# Patient Record
Sex: FEMALE | Race: WHITE | Employment: FULL TIME | URBAN - METROPOLITAN AREA
[De-identification: names, ages, dates, MRNs, and addresses within clinical notes are randomized per-mention and may not be internally consistent; named-entity substitution may affect disease eponyms.]

---

## 2017-01-12 ENCOUNTER — OFFICE VISIT (OUTPATIENT)
Dept: FAMILY MEDICINE CLINIC | Age: 37
End: 2017-01-12

## 2017-01-12 VITALS
DIASTOLIC BLOOD PRESSURE: 72 MMHG | RESPIRATION RATE: 10 BRPM | HEART RATE: 101 BPM | SYSTOLIC BLOOD PRESSURE: 120 MMHG | HEIGHT: 60 IN | BODY MASS INDEX: 35.53 KG/M2 | OXYGEN SATURATION: 98 % | WEIGHT: 181 LBS

## 2017-01-12 DIAGNOSIS — G47.00 INSOMNIA, UNSPECIFIED TYPE: Primary | ICD-10-CM

## 2017-01-12 DIAGNOSIS — F31.77 BIPOLAR DISORDER, IN PARTIAL REMISSION, MOST RECENT EPISODE MIXED (HCC): ICD-10-CM

## 2017-01-12 PROCEDURE — 99213 OFFICE O/P EST LOW 20 MIN: CPT | Performed by: FAMILY MEDICINE

## 2017-01-12 RX ORDER — DIVALPROEX SODIUM 500 MG/1
TABLET, EXTENDED RELEASE ORAL
Qty: 90 TABLET | Refills: 1 | Status: SHIPPED | OUTPATIENT
Start: 2017-01-12 | End: 2017-04-06 | Stop reason: SDUPTHER

## 2017-01-12 RX ORDER — ESCITALOPRAM OXALATE 10 MG/1
10 TABLET ORAL DAILY
Qty: 30 TABLET | Refills: 6 | Status: SHIPPED | OUTPATIENT
Start: 2017-01-12 | End: 2018-03-09 | Stop reason: ALTCHOICE

## 2017-01-12 RX ORDER — TRAZODONE HYDROCHLORIDE 100 MG/1
100 TABLET ORAL NIGHTLY
Qty: 30 TABLET | Refills: 1 | Status: SHIPPED | OUTPATIENT
Start: 2017-01-12 | End: 2017-02-21 | Stop reason: SDUPTHER

## 2017-02-21 ENCOUNTER — OFFICE VISIT (OUTPATIENT)
Dept: FAMILY MEDICINE CLINIC | Age: 37
End: 2017-02-21

## 2017-02-21 VITALS
OXYGEN SATURATION: 98 % | WEIGHT: 185.2 LBS | HEIGHT: 60 IN | BODY MASS INDEX: 36.36 KG/M2 | DIASTOLIC BLOOD PRESSURE: 74 MMHG | HEART RATE: 85 BPM | SYSTOLIC BLOOD PRESSURE: 112 MMHG | RESPIRATION RATE: 16 BRPM

## 2017-02-21 DIAGNOSIS — E55.9 VITAMIN D DEFICIENCY: ICD-10-CM

## 2017-02-21 DIAGNOSIS — F51.01 PRIMARY INSOMNIA: ICD-10-CM

## 2017-02-21 DIAGNOSIS — F31.70 BIPOLAR AFFECTIVE DISORDER IN REMISSION (HCC): Primary | ICD-10-CM

## 2017-02-21 DIAGNOSIS — Z13.220 LIPID SCREENING: ICD-10-CM

## 2017-02-21 DIAGNOSIS — Z79.899 LONG TERM USE OF DRUG: ICD-10-CM

## 2017-02-21 LAB
A/G RATIO: 1.8 (ref 1.1–2.2)
ALBUMIN SERPL-MCNC: 4.4 G/DL (ref 3.4–5)
ALP BLD-CCNC: 73 U/L (ref 40–129)
ALT SERPL-CCNC: 15 U/L (ref 10–40)
ANION GAP SERPL CALCULATED.3IONS-SCNC: 13 MMOL/L (ref 3–16)
AST SERPL-CCNC: 19 U/L (ref 15–37)
BILIRUB SERPL-MCNC: <0.2 MG/DL (ref 0–1)
BUN BLDV-MCNC: 9 MG/DL (ref 7–20)
CALCIUM SERPL-MCNC: 9.3 MG/DL (ref 8.3–10.6)
CARBAMAZEPINE DOSE: ABNORMAL
CARBAMAZEPINE LEVEL: <2 UG/ML (ref 4–12)
CHLORIDE BLD-SCNC: 101 MMOL/L (ref 99–110)
CHOLESTEROL, TOTAL: 230 MG/DL (ref 0–199)
CO2: 26 MMOL/L (ref 21–32)
CREAT SERPL-MCNC: 0.7 MG/DL (ref 0.6–1.1)
GFR AFRICAN AMERICAN: >60
GFR NON-AFRICAN AMERICAN: >60
GLOBULIN: 2.4 G/DL
GLUCOSE BLD-MCNC: 85 MG/DL (ref 70–99)
HDLC SERPL-MCNC: 43 MG/DL (ref 40–60)
LDL CHOLESTEROL CALCULATED: 133 MG/DL
POTASSIUM SERPL-SCNC: 5.2 MMOL/L (ref 3.5–5.1)
SODIUM BLD-SCNC: 140 MMOL/L (ref 136–145)
TOTAL PROTEIN: 6.8 G/DL (ref 6.4–8.2)
TRIGL SERPL-MCNC: 268 MG/DL (ref 0–150)
VITAMIN D 25-HYDROXY: 21.2 NG/ML
VLDLC SERPL CALC-MCNC: 54 MG/DL

## 2017-02-21 PROCEDURE — 99213 OFFICE O/P EST LOW 20 MIN: CPT | Performed by: FAMILY MEDICINE

## 2017-02-21 PROCEDURE — 36415 COLL VENOUS BLD VENIPUNCTURE: CPT | Performed by: FAMILY MEDICINE

## 2017-02-21 RX ORDER — TRAZODONE HYDROCHLORIDE 100 MG/1
50-100 TABLET ORAL NIGHTLY
Qty: 30 TABLET | Refills: 5 | Status: SHIPPED | OUTPATIENT
Start: 2017-02-21 | End: 2017-05-01 | Stop reason: ALTCHOICE

## 2017-04-06 ENCOUNTER — TELEPHONE (OUTPATIENT)
Dept: FAMILY MEDICINE CLINIC | Age: 37
End: 2017-04-06

## 2017-04-06 RX ORDER — DIVALPROEX SODIUM 500 MG/1
TABLET, EXTENDED RELEASE ORAL
Qty: 90 TABLET | Refills: 2 | Status: SHIPPED | OUTPATIENT
Start: 2017-04-06 | End: 2018-03-09 | Stop reason: ALTCHOICE

## 2017-05-01 ENCOUNTER — OFFICE VISIT (OUTPATIENT)
Dept: FAMILY MEDICINE CLINIC | Age: 37
End: 2017-05-01

## 2017-05-01 VITALS
HEIGHT: 60 IN | WEIGHT: 184 LBS | RESPIRATION RATE: 10 BRPM | OXYGEN SATURATION: 98 % | HEART RATE: 88 BPM | DIASTOLIC BLOOD PRESSURE: 72 MMHG | BODY MASS INDEX: 36.12 KG/M2 | SYSTOLIC BLOOD PRESSURE: 118 MMHG

## 2017-05-01 DIAGNOSIS — F31.70 BIPOLAR AFFECTIVE DISORDER IN REMISSION (HCC): ICD-10-CM

## 2017-05-01 DIAGNOSIS — J18.9 PNEUMONIA OF RIGHT UPPER LOBE DUE TO INFECTIOUS ORGANISM: ICD-10-CM

## 2017-05-01 DIAGNOSIS — R05.9 COUGH: Primary | ICD-10-CM

## 2017-05-01 PROCEDURE — 99213 OFFICE O/P EST LOW 20 MIN: CPT | Performed by: FAMILY MEDICINE

## 2018-03-06 ENCOUNTER — HOSPITAL ENCOUNTER (OUTPATIENT)
Dept: OBGYN CLINIC | Age: 38
Discharge: HOME | End: 2018-03-31
Attending: OBSTETRICS & GYNECOLOGY | Admitting: OBSTETRICS & GYNECOLOGY

## 2018-03-09 ENCOUNTER — HOSPITAL ENCOUNTER (OUTPATIENT)
Dept: ULTRASOUND IMAGING | Age: 38
Discharge: HOME | End: 2018-03-09
Attending: OBSTETRICS & GYNECOLOGY | Admitting: OBSTETRICS & GYNECOLOGY

## 2018-03-09 ENCOUNTER — HOSPITAL ENCOUNTER (OUTPATIENT)
Dept: OBGYN CLINIC | Age: 38
Discharge: HOME | End: 2018-03-10
Admitting: OBSTETRICS & GYNECOLOGY

## 2018-03-09 VITALS
SYSTOLIC BLOOD PRESSURE: 127 MMHG | BODY MASS INDEX: 35.27 KG/M2 | WEIGHT: 180.6 LBS | HEART RATE: 84 BPM | DIASTOLIC BLOOD PRESSURE: 87 MMHG

## 2018-03-09 DIAGNOSIS — Z34.90 ENCOUNTER FOR SUPERVISION OF NORMAL PREGNANCY: ICD-10-CM

## 2018-03-09 DIAGNOSIS — Z34.90 ENCOUNTER FOR SUPERVISION OF NORMAL PREGNANCY, ANTEPARTUM, UNSPECIFIED GRAVIDITY: ICD-10-CM

## 2018-03-09 DIAGNOSIS — Z3A.08 8 WEEKS GESTATION OF PREGNANCY: ICD-10-CM

## 2018-03-09 DIAGNOSIS — Z34.90 ENCOUNTER FOR SUPERVISION OF NORMAL PREGNANCY, ANTEPARTUM, UNSPECIFIED GRAVIDITY: Primary | ICD-10-CM

## 2018-03-09 LAB
ABO/RH: NORMAL
AMPHETAMINE SCREEN, URINE: NORMAL
ANTIBODY SCREEN: NORMAL
BARBITURATE SCREEN URINE: NORMAL
BASOPHILS ABSOLUTE: 0.1 K/UL (ref 0–0.2)
BASOPHILS RELATIVE PERCENT: 0.5 %
BENZODIAZEPINE SCREEN, URINE: NORMAL
BILIRUBIN URINE: NEGATIVE MG/DL
BLOOD, URINE: NEGATIVE
BUPRENORPHINE URINE: NORMAL
CANNABINOID SCREEN URINE: NORMAL
CLARITY: CLEAR
COCAINE METABOLITE SCREEN URINE: NORMAL
COLOR: YELLOW
EOSINOPHILS ABSOLUTE: 0.1 K/UL (ref 0–0.6)
EOSINOPHILS RELATIVE PERCENT: 0.9 %
GLUCOSE URINE: NEGATIVE MG/DL
HCT VFR BLD CALC: 40.8 % (ref 36–48)
HEMOGLOBIN: 14.4 G/DL (ref 12–16)
HEPATITIS B SURFACE ANTIGEN INTERPRETATION: ABNORMAL
HIV AG/AB: NORMAL
HIV ANTIGEN: NORMAL
HIV-1 ANTIBODY: NORMAL
HIV-2 AB: NORMAL
KETONES, URINE: NEGATIVE MG/DL
LEUKOCYTE ESTERASE, URINE: NEGATIVE
LYMPHOCYTES ABSOLUTE: 2.3 K/UL (ref 1–5.1)
LYMPHOCYTES RELATIVE PERCENT: 20.9 %
Lab: NORMAL
MCH RBC QN AUTO: 34 PG (ref 26–34)
MCHC RBC AUTO-ENTMCNC: 35.2 G/DL (ref 31–36)
MCV RBC AUTO: 96.7 FL (ref 80–100)
METHADONE SCREEN, URINE: NORMAL
MONOCYTES ABSOLUTE: 1.2 K/UL (ref 0–1.3)
MONOCYTES RELATIVE PERCENT: 10.7 %
NEUTROPHILS ABSOLUTE: 7.5 K/UL (ref 1.7–7.7)
NEUTROPHILS RELATIVE PERCENT: 67 %
NITRITE, URINE: NEGATIVE
OPIATE SCREEN URINE: NORMAL
OXYCODONE URINE: NORMAL
PDW BLD-RTO: 13.3 % (ref 12.4–15.4)
PH UA: 5
PH UA: 6.5
PHENCYCLIDINE SCREEN URINE: NORMAL
PLATELET # BLD: 241 K/UL (ref 135–450)
PMV BLD AUTO: 9.7 FL (ref 5–10.5)
PROPOXYPHENE SCREEN: NORMAL
PROTEIN UA: NEGATIVE MG/DL
RBC # BLD: 4.22 M/UL (ref 4–5.2)
RPR: ABNORMAL
RUBELLA ANTIBODY IGG: 174 IU/ML
SPECIFIC GRAVITY UA: 1.01
UROBILINOGEN, URINE: 0.2 E.U./DL
WBC # BLD: 11.2 K/UL (ref 4–11)

## 2018-03-09 PROCEDURE — 0501F PRENATAL FLOW SHEET: CPT | Performed by: OBSTETRICS & GYNECOLOGY

## 2018-03-09 RX ORDER — QUETIAPINE FUMARATE 25 MG/1
25 TABLET, FILM COATED ORAL PRN
COMMUNITY
End: 2021-04-14

## 2018-03-09 NOTE — PLAN OF CARE
Prenatal Clinic  Clinical Level of [address].    NAME: Aureliano Jacques  YOB: 1980 GENDER: female  MEDICAL RECORD NUMBER:  [id number]  DATE:  3/9/2018    Assessment   Points   No Assessment []   0   General Prenatal assessment (e.g. weight, vital signs, urine test). Completed OB Clinic navigator tabs, in partnership with the Registered Nurse. []   1   General Prenatal assessment (e.g. weight, vital signs, urine test). Completed OB Clinic navigator tabs, in partnership with the Registered Nurse. Set up tests or procedures (e.g. ultrasound, specimen swabs, induction). [x]   2   Full Prenatal assessment (e.g. weight, vitals signs, urine test) to include a full review of history. Completed OB Clinic navigator tabs, in partnership with the Registered Nurse. Set up tests or procedures (e.g. ultrasound, specimen swabs, induction). Or transfer to an outside facility, transfer to [facility] for further evaluation, admission to the hospital.  []   3       Ambulation Status   Status Definition Points   Independent Independently able to ambulate. Fully able (without any assistance) to get on/off exam table/chair. [x]   0   Minimal Physical Assistance Requires physical assistance of one person to ambulate and/or position patient to be examined. Includes necessary physical assistance to position lower extremities on/off stool. []   1   Moderate Physical Assistance Requires at least one staff member to physically assist patient in ambulating into treatment room, and on/off recliner chair. []   2   Full Assistance Requires assistance of at least two staff members to transfer patient into treatment room and/or on/off exam table/chair. \"Total Transfer\". []   3       Teaching Effort   Effort Definition Points   No Teaching  []   0   General The teaching will focus on visit schedule and laboratory tests. This education can be found in the Discharge Instructions.  []   1 Intermediate The teaching will focus on visit schedule and laboratory tests, plus additional topics such as health and lifestyle (e.g. kick counts, diet). This education can be found in the Discharge Instructions []   2   Complex New patient information packet given to the patient/caregiver and reviewed with the Registered Nurse. [x]   3       Patient Discharge and Planning  Planning Definition Points   General Follow-up with routine assessment and planning. Discharge instructions and After Visit Summary given to patient/caregiver and reviewed with the Registered Nurse. Simple follow-up with routine assessment and planning. [x]   1   Intermediate Follow-up with routine assessment and planning. Discharge instructions and After Visit Summary given to patient/caregiver and reviewed with the Registered Nurse. Contact with additional resources (e.g. , Physician, Lactation). May include filling out forms, writing letters, communication with insurance , FLMA forms, etc.   []   2   Complex Full, comprehensive assessment and planning which includes assistance for a hospital admission or transfer to a higher level of care facility.   []   3     Is this the Patient's First Visit to the Prenatal Clinic    Yes     Is this Patient Established @ this [facility] AT [place]  Yes             Clinical Level of Care      Points  0-3  Level 1 []     Points  4-6  Level 2 [x]     Points  7-8  Level 3 []     Points  9-10  Level 4 []     Points  11-12  Level 5 []       Electronically signed by Chely Muse RN on 3/9/2018 at 9:36 AM

## 2018-03-09 NOTE — PROGRESS NOTES
Problem List Items Addressed This Visit     None      Visit Diagnoses     Encounter for supervision of normal pregnancy, antepartum, unspecified     -  Primary    Relevant Orders    Ultrasound OB less than 14 weeks single or first gestation (Completed)    Obstetric panel    RPR Reflex to [place]iter and TPPA    Type and Screen    HIV Screen                  Prenatal [address] [place] [address], [address]  [address]    First OB visit Progress Note      Subjective:     Chief Complaint: To establish prenatal care       HISTORY of PRESENT ILLNESS (HPI)     History of  Naif Sun is a 40 y.o.,  at 7w4d who presents to the clinic for her first OB visit. Era Mason  denies any complaints at this time. She does not complain of vaginal bleeding. She does not report leakage of fluid. She  does not complain of contractions. She  does not complain of decreased fetal movement. PAST MEDICAL HISTORY        Diagnosis Date    Anxiety     Psychiatric problem         PAST SURGICAL HISTORY    Past Surgical History:   Procedure Laterality Date    ANTERIOR CRUCIATE LIGAMENT REPAIR       SECTION         FAMILY HISTORY    Family History   Problem Relation Age of Onset    Heart Disease Father        SOCIAL HISTORY    Social History   Substance Use Topics    Smoking status: Current Every Day Smoker     Packs/day: 0.50     Years: 18.00     Types: Cigarettes    Smokeless tobacco: Never Used    Alcohol use No       ALLERGIES    Allergies   Allergen Reactions    Codeine        MEDICATIONS    No current outpatient prescriptions on file prior to encounter. No current facility-administered medications on file prior to encounter. REVIEW OF SYSTEMS    Pertinent items are noted in HPI.     Objective:      /87   Pulse 84   Wt 180 lb 9.6 oz (81.9 kg)   LMP 01/15/2018   BMI 35.27 kg/m²     Wt Readings from Last 2 Encounters:   18

## 2018-03-10 LAB — URINE CULTURE, ROUTINE: NORMAL

## 2018-03-13 PROBLEM — Z3A.08 8 WEEKS GESTATION OF PREGNANCY: Status: ACTIVE | Noted: 2018-03-13

## 2018-03-13 LAB
C. TRACHOMATIS DNA,THIN PREP: NEGATIVE
HPV COMMENT: NORMAL
HPV TYPE 16: NOT DETECTED
HPV TYPE 18: NOT DETECTED
HPVOH (OTHER TYPES): NOT DETECTED
N. GONORRHOEAE DNA, THIN PREP: NEGATIVE

## 2018-03-19 ENCOUNTER — HOSPITAL ENCOUNTER (OUTPATIENT)
Dept: ULTRASOUND IMAGING | Age: 38
Discharge: HOME | End: 2018-03-19
Attending: OBSTETRICS & GYNECOLOGY | Admitting: OBSTETRICS & GYNECOLOGY

## 2018-03-19 DIAGNOSIS — Z34.90 ENCOUNTER FOR SUPERVISION OF NORMAL PREGNANCY: ICD-10-CM

## 2018-03-19 DIAGNOSIS — Z34.90 ENCOUNTER FOR SUPERVISION OF NORMAL PREGNANCY, ANTEPARTUM, UNSPECIFIED GRAVIDITY: ICD-10-CM

## 2018-03-21 PROBLEM — Q51.9 UTERINE ANOMALY: Status: ACTIVE | Noted: 2018-03-21

## 2018-04-01 ENCOUNTER — HOSPITAL ENCOUNTER (OUTPATIENT)
Dept: OBGYN CLINIC | Age: 38
Discharge: HOME | End: 2018-04-30
Attending: OBSTETRICS & GYNECOLOGY | Admitting: OBSTETRICS & GYNECOLOGY

## 2018-04-03 ENCOUNTER — HOSPITAL ENCOUNTER (OUTPATIENT)
Dept: OBGYN CLINIC | Age: 38
Discharge: HOME | End: 2018-04-04
Admitting: OBSTETRICS & GYNECOLOGY

## 2018-04-03 VITALS
DIASTOLIC BLOOD PRESSURE: 78 MMHG | SYSTOLIC BLOOD PRESSURE: 119 MMHG | WEIGHT: 183.6 LBS | HEART RATE: 92 BPM | BODY MASS INDEX: 35.86 KG/M2

## 2018-04-03 LAB
BILIRUBIN URINE: NEGATIVE MG/DL
BLOOD, URINE: NEGATIVE
CLARITY: CLEAR
COLOR: YELLOW
GLUCOSE URINE: NEGATIVE MG/DL
KETONES, URINE: NEGATIVE MG/DL
LEUKOCYTE ESTERASE, URINE: NEGATIVE
NITRITE, URINE: NEGATIVE
PH UA: 5.5
PROTEIN UA: NEGATIVE MG/DL
SPECIFIC GRAVITY UA: 1.01
UROBILINOGEN, URINE: 0.2 E.U./DL

## 2018-04-03 PROCEDURE — 0502F SUBSEQUENT PRENATAL CARE: CPT | Performed by: OBSTETRICS & GYNECOLOGY

## 2018-04-03 NOTE — PROGRESS NOTES
alert and oriented to person, place and time, well-developed and well-nourished, in no acute distress  Cardiovascular: Edema  is not present. Respiratory effort: Respirations  are easy and without stridor. Gastrointestinal: Abdomen  is soft and  is non tender and gravid  Vagina: Not examined at this visit. Bladder: Not examined at this visit. Uterus:  Non tender. Pelvic Exam:  A pelvic exam was not completed during this visit. Verbal consent obtained for pelvic exam:  NA  Cervix: N/A. Specimens obtained: None. Response to pelvic exam:  Well tolerated by patient. Assessment:     Patient Active Problem List   Diagnosis Code    Bipolar disorder ([facility] F31.9    40 y.o.  Z3A.08    Uterine anomaly Q51.9       40 y.o. [id number] 9w0d    Obstetrician: Marta Bhardwaj MD      Plan:     - Prenatal labs: Reviewed by the physician. DECLINES  SCREEN  - Patient to follow up in: 1 month    Kick count N/A. Pregnancy expectations were discussed and all questions were answered.             Electronically signed by Marta Bhardwaj MD on 4/3/2018 at 2:21 PM

## 2018-04-03 NOTE — PLAN OF CARE
Prenatal Clinic  Clinical Level of [address]    NAME: Hodan Collier  YOB: 1980 GENDER: female  MEDICAL RECORD NUMBER:  [id number]  DATE:  4/3/2018    Assessment   Points   No Assessment []   0   General Prenatal assessment (e.g. weight, vital signs, urine test). Completed OB Clinic navigator tabs, in partnership with the Registered Nurse. [x]   1   General Prenatal assessment (e.g. weight, vital signs, urine test). Completed OB Clinic navigator tabs, in partnership with the Registered Nurse. Set up tests or procedures (e.g. ultrasound, specimen swabs, induction). []   2   Full Prenatal assessment (e.g. weight, vitals signs, urine test) to include a full review of history. Completed OB Clinic navigator tabs, in partnership with the Registered Nurse. Set up tests or procedures (e.g. ultrasound, specimen swabs, induction). Or transfer to an outside facility, transfer to [facility] for further evaluation, admission to the hospital.  []   3       Ambulation Status   Status Definition Points   Independent Independently able to ambulate. Fully able (without any assistance) to get on/off exam table/chair. [x]   0   Minimal Physical Assistance Requires physical assistance of one person to ambulate and/or position patient to be examined. Includes necessary physical assistance to position lower extremities on/off stool. []   1   Moderate Physical Assistance Requires at least one staff member to physically assist patient in ambulating into treatment room, and on/off recliner chair. []   2   Full Assistance Requires assistance of at least two staff members to transfer patient into treatment room and/or on/off exam table/chair. \"Total Transfer\". []   3       Teaching Effort   Effort Definition Points   No Teaching  []   0   General The teaching will focus on visit schedule and laboratory tests. This education can be found in the Discharge Instructions.  [x]   1 Intermediate The teaching will focus on visit schedule and laboratory tests, plus additional topics such as health and lifestyle (e.g. kick counts, diet). This education can be found in the Discharge Instructions []   2   Complex New patient information packet given to the patient/caregiver and reviewed with the Registered Nurse.   []   3       Patient Discharge and Planning  Planning Definition Points   General Follow-up with routine assessment and planning. Discharge instructions and After Visit Summary given to patient/caregiver and reviewed with the Registered Nurse. Simple follow-up with routine assessment and planning. [x]   1   Intermediate Follow-up with routine assessment and planning. Discharge instructions and After Visit Summary given to patient/caregiver and reviewed with the Registered Nurse. Contact with additional resources (e.g. , Physician, Lactation). May include filling out forms, writing letters, communication with insurance , FLMA forms, etc.   []   2   Complex Full, comprehensive assessment and planning which includes assistance for a hospital admission or transfer to a higher level of care facility.   []   3     Is this the Patient's First Visit to the Prenatal Clinic    No     Is this Patient Established @ [facility]  Yes             Clinical Level of Care      Points  0-3  Level 1 [x]     Points  4-6  Level 2 []     Points  7-8  Level 3 []     Points  9-10  Level 4 []     Points  11-12  Level 5 []       Electronically signed by Khurram Kim RN on 4/3/2018 at 3:21 PM

## 2018-05-01 ENCOUNTER — HOSPITAL ENCOUNTER (OUTPATIENT)
Dept: OBGYN CLINIC | Age: 38
Discharge: HOME | End: 2018-05-31
Attending: OBSTETRICS & GYNECOLOGY | Admitting: OBSTETRICS & GYNECOLOGY

## 2018-05-01 ENCOUNTER — HOSPITAL ENCOUNTER (OUTPATIENT)
Dept: OBGYN CLINIC | Age: 38
Discharge: HOME | End: 2018-05-02
Admitting: OBSTETRICS & GYNECOLOGY

## 2018-05-01 VITALS
SYSTOLIC BLOOD PRESSURE: 123 MMHG | HEART RATE: 80 BPM | WEIGHT: 184 LBS | BODY MASS INDEX: 35.94 KG/M2 | DIASTOLIC BLOOD PRESSURE: 83 MMHG

## 2018-05-01 DIAGNOSIS — Z3A.08 8 WEEKS GESTATION OF PREGNANCY: Primary | ICD-10-CM

## 2018-05-01 DIAGNOSIS — Z98.891 H/O: C-SECTION: ICD-10-CM

## 2018-05-01 DIAGNOSIS — Q51.9 UTERINE ANOMALY: ICD-10-CM

## 2018-05-01 LAB
BILIRUBIN URINE: NEGATIVE MG/DL
BLOOD, URINE: NEGATIVE
CLARITY: ABNORMAL
COLOR: YELLOW
GLUCOSE URINE: NEGATIVE MG/DL
KETONES, URINE: 15 MG/DL
LEUKOCYTE ESTERASE, URINE: ABNORMAL
NITRITE, URINE: NEGATIVE
PH UA: 5.5
PROTEIN UA: 30 MG/DL
SPECIFIC GRAVITY UA: 1.02
UROBILINOGEN, URINE: 0.2 E.U./DL

## 2018-05-01 PROCEDURE — 0502F SUBSEQUENT PRENATAL CARE: CPT | Performed by: OBSTETRICS & GYNECOLOGY

## 2018-05-01 NOTE — PROGRESS NOTES
Prenatal [address] [facility]  [address], [address] [employer], [facility] 24      OB Follow-Up Visit Progress Note    Chief Complaint   Patient presents with    Routine Prenatal Visit        Subjective:     HISTORY OF PRESENT ILLNESS (HPI)    History of  Yuko Cormier is a 40 y.o.,  at 13w0d who presents to the clinic for her follow-up OB visit. Adrianne Daigle  denies any complaints at this time. She does not complain of vaginal bleeding. She does not report leakage of fluid. She  does not complain of contractions. She  does not complain of decreased fetal movement. Has headaches with sound and vision changes. PAST MEDICAL HISTORY        Diagnosis Date    Anxiety     Psychiatric problem         PAST SURGICAL HISTORY    Past Surgical History:   Procedure Laterality Date    ANTERIOR CRUCIATE LIGAMENT REPAIR       SECTION         FAMILY HISTORY    Family History   Problem Relation Age of Onset    Heart Disease Father        SOCIAL HISTORY    Social History   Substance Use Topics    Smoking status: Former Smoker     Packs/day: 0.50     Years: 18.00     Types: Cigarettes     Quit date: 2018    Smokeless tobacco: Never Used    Alcohol use No       ALLERGIES    Allergies   Allergen Reactions    Codeine        MEDICATIONS    Current Outpatient Prescriptions on File Prior to Encounter   Medication Sig Dispense Refill    QUEtiapine (SEROQUEL) 25 MG tablet Take 25 mg by mouth as needed      Prenatal MV-Min-Fe Fum-FA-DHA (PRENATAL 1 PO) Take by mouth daily       No current facility-administered medications on file prior to encounter. REVIEW OF SYSTEMS    Pertinent items are noted in HPI.     Objective:      /83   Pulse 80   Wt 184 lb (83.5 kg)   LMP 01/15/2018   BMI 35.94 kg/m²     Wt Readings from Last 2 Encounters:   18 184 lb (83.5 kg)   18 189 lb 13.1 oz (86.1 kg)       See ACOG flowsheet above    Physical Exam:  A

## 2018-05-01 NOTE — PROGRESS NOTES
Pt reports recent blurry vision, with \"pain like a brain freeze on front left of head\"; has happened approximately 10 times. Went to ED 4/14/18- see notes.

## 2018-05-29 ENCOUNTER — HOSPITAL ENCOUNTER (OUTPATIENT)
Dept: OBGYN CLINIC | Age: 38
Discharge: HOME | End: 2018-05-30
Admitting: OBSTETRICS & GYNECOLOGY

## 2018-05-29 VITALS
BODY MASS INDEX: 36.56 KG/M2 | DIASTOLIC BLOOD PRESSURE: 88 MMHG | SYSTOLIC BLOOD PRESSURE: 133 MMHG | WEIGHT: 187.2 LBS | HEART RATE: 94 BPM

## 2018-05-29 DIAGNOSIS — Z3A.08 8 WEEKS GESTATION OF PREGNANCY: ICD-10-CM

## 2018-05-29 DIAGNOSIS — Q51.9 UTERINE ANOMALY: ICD-10-CM

## 2018-05-29 LAB
BILIRUBIN URINE: NEGATIVE MG/DL
BLOOD, URINE: NEGATIVE
CLARITY: CLEAR
COLOR: YELLOW
GLUCOSE URINE: NEGATIVE MG/DL
KETONES, URINE: NEGATIVE MG/DL
LEUKOCYTE ESTERASE, URINE: NEGATIVE
NITRITE, URINE: NEGATIVE
PH UA: 6.5
PROTEIN UA: NEGATIVE MG/DL
SPECIFIC GRAVITY UA: 1.01
UROBILINOGEN, URINE: 0.2 E.U./DL

## 2018-05-29 PROCEDURE — 0502F SUBSEQUENT PRENATAL CARE: CPT | Performed by: OBSTETRICS & GYNECOLOGY

## 2018-05-29 NOTE — PROGRESS NOTES
Prenatal [facility]  [address] [facility], [address] [place] [address], [facility] 24      OB Follow-Up Visit Progress Note    Chief Complaint   Patient presents with    Routine Prenatal Visit        Subjective:     HISTORY OF PRESENT ILLNESS (HPI)    History of  Martha Sandoval is a 40 y.o.,  at 17w0d who presents to the clinic for her follow-up OB visit. Adiel Doherty  denies any complaints at this time. She does not complain of vaginal bleeding. She does not report leakage of fluid. She  does not complain of contractions. She  does not complain of decreased fetal movement. PAST MEDICAL HISTORY        Diagnosis Date    Anxiety     Psychiatric problem         PAST SURGICAL HISTORY    Past Surgical History:   Procedure Laterality Date    ANTERIOR CRUCIATE LIGAMENT REPAIR       SECTION         FAMILY HISTORY    Family History   Problem Relation Age of Onset    Heart Disease Father        SOCIAL HISTORY    Social History   Substance Use Topics    Smoking status: Former Smoker     Packs/day: 0.50     Years: 18.00     Types: Cigarettes     Quit date: 2018    Smokeless tobacco: Never Used    Alcohol use No       ALLERGIES    Allergies   Allergen Reactions    Codeine        MEDICATIONS    Prior to Admission medications    Medication Sig Start Date End Date Taking? Authorizing Provider   QUEtiapine (SEROQUEL) 25 MG tablet Take 25 mg by mouth as needed   Yes Historical Provider, MD   Prenatal MV-Min-Fe Fum-FA-DHA (PRENATAL 1 PO) Take by mouth daily   Yes Historical Provider, MD       REVIEW OF SYSTEMS    Pertinent items are noted in HPI.     Objective:      /88   Pulse 94   Wt 187 lb 3.2 oz (84.9 kg)   LMP 01/15/2018   BMI 36.56 kg/m²     Wt Readings from Last 2 Encounters:   18 187 lb 3.2 oz (84.9 kg)   18 184 lb (83.5 kg)       See ACOG flowsheet above    Physical Exam:  A physical exam was performed during this visit. Neurologic/Psychiatric: alert and oriented X3  Constitutional: alert and oriented to person, place and time, well-developed and well-nourished, in no acute distress  Cardiovascular: Edema  is not present. Respiratory effort: Respirations  are easy and without stridor. Gastrointestinal: Abdomen  is soft and  is non tender and gravid  Vagina: Not examined at this visit. Bladder: Not examined at this visit. Uterus:  Enlarged. Pelvic Exam:  A pelvic exam was not completed at this visit. Assessment:     Patient Active Problem List   Diagnosis Code    [facility] F31.9    40 y.o.  Z3A.08    Uterine anomaly Q51.9    H/O:  Z80.0       40 y.o. [id number] 17w0d    Obstetrician: Lennox Pellet, MD      Plan:     - Prenatal labs: Reviewed by the physician. DECLINES QS. NL CL-5CM  - Patient to follow up in: 4 weeks W/US    Kick count N/A. Pregnancy expectations were discussed and all questions were answered.                 Electronically signed by Lennox Pellet, MD on 2018 at 1:53 PM

## 2018-05-30 NOTE — PROGRESS NOTES
Dr. Genny Tubbs reviewed [address] [facility] from 5/29/18 for CL. CL is 5.1cm, cervix closed.

## 2018-06-01 ENCOUNTER — HOSPITAL ENCOUNTER (OUTPATIENT)
Dept: OBGYN CLINIC | Age: 38
Discharge: HOME | End: 2018-06-30
Attending: OBSTETRICS & GYNECOLOGY | Admitting: OBSTETRICS & GYNECOLOGY

## 2018-06-01 NOTE — PLAN OF CARE
Prenatal Clinic  Clinical Level of [address].    NAME: Xiang Kurtz  YOB: 1980 GENDER: female  MEDICAL RECORD NUMBER:  [id number]  DATE:  5/29/2018    Assessment   Points   No Assessment []   0   General Prenatal assessment (e.g. weight, vital signs, urine test). Completed OB Clinic navigator tabs, in partnership with the Registered Nurse. [x]   1   General Prenatal assessment (e.g. weight, vital signs, urine test). Completed OB Clinic navigator tabs, in partnership with the Registered Nurse. Set up tests or procedures (e.g. ultrasound, specimen swabs, induction). []   2   Full Prenatal assessment (e.g. weight, vitals signs, urine test) to include a full review of history. Completed OB Clinic navigator tabs, in partnership with the Registered Nurse. Set up tests or procedures (e.g. ultrasound, specimen swabs, induction). Or transfer to an outside facility, transfer to [facility] for further evaluation, admission to the hospital.  []   3       Ambulation Status   Status Definition Points   Independent Independently able to ambulate. Fully able (without any assistance) to get on/off exam table/chair. [x]   0   Minimal Physical Assistance Requires physical assistance of one person to ambulate and/or position patient to be examined. Includes necessary physical assistance to position lower extremities on/off stool. []   1   Moderate Physical Assistance Requires at least one staff member to physically assist patient in ambulating into treatment room, and on/off recliner chair. []   2   Full Assistance Requires assistance of at least two staff members to transfer patient into treatment room and/or on/off exam table/chair. \"Total Transfer\". []   3       Teaching Effort   Effort Definition Points   No Teaching  []   0   General The teaching will focus on visit schedule and laboratory tests. This education can be found in the Discharge Instructions.  [x]   1 Intermediate The teaching will focus on visit schedule and laboratory tests, plus additional topics such as health and lifestyle (e.g. kick counts, diet). This education can be found in the Discharge Instructions []   2   Complex New patient information packet given to the patient/caregiver and reviewed with the Registered Nurse.   []   3       Patient Discharge and Planning  Planning Definition Points   General Follow-up with routine assessment and planning. Discharge instructions and After Visit Summary given to patient/caregiver and reviewed with the Registered Nurse. Simple follow-up with routine assessment and planning. [x]   1   Intermediate Follow-up with routine assessment and planning. Discharge instructions and After Visit Summary given to patient/caregiver and reviewed with the Registered Nurse. Contact with additional resources (e.g. , Physician, Lactation). May include filling out forms, writing letters, communication with insurance , FLMA forms, etc.   []   2   Complex Full, comprehensive assessment and planning which includes assistance for a hospital admission or transfer to a higher level of care facility.   []   3     Is this the Patient's First Visit to the Prenatal Clinic    No     Is this Patient Established @ this [facility] AT [place]  Yes             Clinical Level of Care      Points  0-3  Level 1 [x]     Points  4-6  Level 2 []     Points  7-8  Level 3 []     Points  9-10  Level 4 []     Points  11-12  Level 5 []       Electronically signed by Yordan Soto RN on 6/1/2018 at 9:33 AM

## 2018-07-23 LAB
GLUCOSE CHALLENGE: 114 MG/DL
HCT VFR BLD CALC: 30.6 % (ref 36–48)
HEMOGLOBIN: 10.4 G/DL (ref 12–16)
HEPATITIS C ANTIBODY INTERPRETATION: NORMAL
MCH RBC QN AUTO: 32.5 PG (ref 26–34)
MCHC RBC AUTO-ENTMCNC: 34 G/DL (ref 31–36)
MCV RBC AUTO: 95.7 FL (ref 80–100)
PDW BLD-RTO: 12.9 % (ref 12.4–15.4)
PLATELET # BLD: 202 K/UL (ref 135–450)
PMV BLD AUTO: 10.7 FL (ref 5–10.5)
RBC # BLD: 3.2 M/UL (ref 4–5.2)
WBC # BLD: 12 K/UL (ref 4–11)

## 2018-08-19 PROBLEM — O36.8130 DECREASED FETAL MOVEMENTS IN THIRD TRIMESTER: Status: ACTIVE | Noted: 2018-08-19

## 2018-10-16 ENCOUNTER — HOSPITAL ENCOUNTER (OUTPATIENT)
Age: 38
Discharge: HOME | End: 2018-10-16
Attending: OBSTETRICS & GYNECOLOGY | Admitting: OBSTETRICS & GYNECOLOGY
Payer: COMMERCIAL

## 2018-10-16 VITALS
SYSTOLIC BLOOD PRESSURE: 125 MMHG | WEIGHT: 192 LBS | BODY MASS INDEX: 37.69 KG/M2 | RESPIRATION RATE: 18 BRPM | HEIGHT: 60 IN | TEMPERATURE: 97.8 F | DIASTOLIC BLOOD PRESSURE: 85 MMHG | HEART RATE: 86 BPM

## 2018-10-16 PROBLEM — O47.9 THREATENED LABOR: Status: ACTIVE | Noted: 2018-10-16

## 2018-10-16 LAB
A/G RATIO: 1 (ref 1.1–2.2)
ALBUMIN SERPL-MCNC: 3.4 G/DL (ref 3.4–5)
ALP BLD-CCNC: 117 U/L (ref 40–129)
ALT SERPL-CCNC: 15 U/L (ref 10–40)
ANION GAP SERPL CALCULATED.3IONS-SCNC: 14 MMOL/L (ref 3–16)
AST SERPL-CCNC: 28 U/L (ref 15–37)
BASOPHILS ABSOLUTE: 0.1 K/UL (ref 0–0.2)
BASOPHILS RELATIVE PERCENT: 0.8 %
BILIRUB SERPL-MCNC: <0.2 MG/DL (ref 0–1)
BILIRUBIN URINE: NEGATIVE
BLOOD, URINE: NEGATIVE
BUN BLDV-MCNC: 4 MG/DL (ref 7–20)
CALCIUM SERPL-MCNC: 8.8 MG/DL (ref 8.3–10.6)
CHLORIDE BLD-SCNC: 108 MMOL/L (ref 99–110)
CLARITY: CLEAR
CO2: 16 MMOL/L (ref 21–32)
COLOR: YELLOW
CREAT SERPL-MCNC: 0.5 MG/DL (ref 0.6–1.1)
EOSINOPHILS ABSOLUTE: 0.1 K/UL (ref 0–0.6)
EOSINOPHILS RELATIVE PERCENT: 0.7 %
EPITHELIAL CELLS, UA: 7 /HPF (ref 0–5)
GFR AFRICAN AMERICAN: >60
GFR NON-AFRICAN AMERICAN: >60
GLOBULIN: 3.4 G/DL
GLUCOSE BLD-MCNC: 84 MG/DL (ref 70–99)
GLUCOSE URINE: NEGATIVE MG/DL
HCT VFR BLD CALC: 31.8 % (ref 36–48)
HEMOGLOBIN: 11 G/DL (ref 12–16)
HYALINE CASTS: 2 /LPF (ref 0–8)
KETONES, URINE: NEGATIVE MG/DL
LEUKOCYTE ESTERASE, URINE: ABNORMAL
LYMPHOCYTES ABSOLUTE: 1.8 K/UL (ref 1–5.1)
LYMPHOCYTES RELATIVE PERCENT: 15.7 %
MCH RBC QN AUTO: 32.4 PG (ref 26–34)
MCHC RBC AUTO-ENTMCNC: 34.6 G/DL (ref 31–36)
MCV RBC AUTO: 93.7 FL (ref 80–100)
MICROSCOPIC EXAMINATION: YES
MONOCYTES ABSOLUTE: 0.7 K/UL (ref 0–1.3)
MONOCYTES RELATIVE PERCENT: 6.5 %
NEUTROPHILS ABSOLUTE: 8.7 K/UL (ref 1.7–7.7)
NEUTROPHILS RELATIVE PERCENT: 76.3 %
NITRITE, URINE: NEGATIVE
PDW BLD-RTO: 13.2 % (ref 12.4–15.4)
PH UA: 6.5
PLATELET # BLD: 214 K/UL (ref 135–450)
PMV BLD AUTO: 10.5 FL (ref 5–10.5)
POTASSIUM SERPL-SCNC: 4.3 MMOL/L (ref 3.5–5.1)
PROTEIN UA: NEGATIVE MG/DL
RBC # BLD: 3.39 M/UL (ref 4–5.2)
RBC UA: 2 /HPF (ref 0–4)
SODIUM BLD-SCNC: 138 MMOL/L (ref 136–145)
SPECIFIC GRAVITY UA: 1.01
TOTAL PROTEIN: 6.8 G/DL (ref 6.4–8.2)
URIC ACID, SERUM: 6 MG/DL (ref 2.6–6)
URINE TYPE: ABNORMAL
UROBILINOGEN, URINE: 0.2 E.U./DL
WBC # BLD: 11.3 K/UL (ref 4–11)
WBC UA: 2 /HPF (ref 0–5)

## 2018-10-16 PROCEDURE — 81001 URINALYSIS AUTO W/SCOPE: CPT

## 2018-10-16 PROCEDURE — 80053 COMPREHEN METABOLIC PANEL: CPT

## 2018-10-16 PROCEDURE — 85025 COMPLETE CBC W/AUTO DIFF WBC: CPT

## 2018-10-16 PROCEDURE — 99211 OFF/OP EST MAY X REQ PHY/QHP: CPT

## 2018-10-16 PROCEDURE — 59025 FETAL NON-STRESS TEST: CPT

## 2018-10-16 PROCEDURE — 96360 HYDRATION IV INFUSION INIT: CPT

## 2018-10-16 PROCEDURE — 2580000003 HC RX 258: Performed by: OBSTETRICS & GYNECOLOGY

## 2018-10-16 PROCEDURE — 84550 ASSAY OF BLOOD/URIC ACID: CPT

## 2018-10-16 RX ORDER — SODIUM CHLORIDE, SODIUM LACTATE, POTASSIUM CHLORIDE, CALCIUM CHLORIDE 600; 310; 30; 20 MG/100ML; MG/100ML; MG/100ML; MG/100ML
INJECTION, SOLUTION INTRAVENOUS CONTINUOUS
Status: DISCONTINUED | OUTPATIENT
Start: 2018-10-16 | End: 2018-10-16 | Stop reason: HOSPADM

## 2018-10-16 RX ADMIN — SODIUM CHLORIDE, POTASSIUM CHLORIDE, SODIUM LACTATE AND CALCIUM CHLORIDE: 600; 310; 30; 20 INJECTION, SOLUTION INTRAVENOUS at 16:45

## 2018-10-18 ENCOUNTER — ANESTHESIA EVENT (OUTPATIENT)
Dept: LABOR AND DELIVERY | Age: 38
End: 2018-10-18
Payer: COMMERCIAL

## 2018-10-18 ENCOUNTER — ANESTHESIA (OUTPATIENT)
Dept: LABOR AND DELIVERY | Age: 38
End: 2018-10-18
Payer: COMMERCIAL

## 2018-10-18 ENCOUNTER — HOSPITAL ENCOUNTER (OUTPATIENT)
Age: 38
Setting detail: OBSERVATION
Discharge: HOME | End: 2018-10-21
Attending: OBSTETRICS & GYNECOLOGY | Admitting: OBSTETRICS & GYNECOLOGY
Payer: COMMERCIAL

## 2018-10-18 PROBLEM — O16.3 HYPERTENSION AFFECTING PREGNANCY IN THIRD TRIMESTER: Status: ACTIVE | Noted: 2018-10-18

## 2018-10-18 PROBLEM — O13.3 GESTATIONAL HYPERTENSION W/O SIGNIFICANT PROTEINURIA IN 3RD TRIMESTER: Status: ACTIVE | Noted: 2018-10-18

## 2018-10-18 LAB
A/G RATIO: 1 (ref 1.1–2.2)
ABO/RH: NORMAL
ALBUMIN SERPL-MCNC: 3.4 G/DL (ref 3.4–5)
ALP BLD-CCNC: 117 U/L (ref 40–129)
ALT SERPL-CCNC: 13 U/L (ref 10–40)
AMPHETAMINE SCREEN, URINE: NORMAL
ANION GAP SERPL CALCULATED.3IONS-SCNC: 14 MMOL/L (ref 3–16)
ANTIBODY SCREEN: NORMAL
AST SERPL-CCNC: 27 U/L (ref 15–37)
BARBITURATE SCREEN URINE: NORMAL
BASOPHILS ABSOLUTE: 0.1 K/UL (ref 0–0.2)
BASOPHILS RELATIVE PERCENT: 0.6 %
BENZODIAZEPINE SCREEN, URINE: NORMAL
BILIRUB SERPL-MCNC: <0.2 MG/DL (ref 0–1)
BUN BLDV-MCNC: 6 MG/DL (ref 7–20)
BUPRENORPHINE URINE: NORMAL
CALCIUM SERPL-MCNC: 10 MG/DL (ref 8.3–10.6)
CANNABINOID SCREEN URINE: NORMAL
CHLORIDE BLD-SCNC: 104 MMOL/L (ref 99–110)
CO2: 18 MMOL/L (ref 21–32)
COCAINE METABOLITE SCREEN URINE: NORMAL
CREAT SERPL-MCNC: 0.7 MG/DL (ref 0.6–1.1)
EOSINOPHILS ABSOLUTE: 0.1 K/UL (ref 0–0.6)
EOSINOPHILS RELATIVE PERCENT: 0.6 %
FIBRINOGEN: 470 MG/DL (ref 200–397)
GFR AFRICAN AMERICAN: >60
GFR NON-AFRICAN AMERICAN: >60
GLOBULIN: 3.3 G/DL
GLUCOSE BLD-MCNC: 91 MG/DL (ref 70–99)
HCT VFR BLD CALC: 34.5 % (ref 36–48)
HEMOGLOBIN: 11.2 G/DL (ref 12–16)
LYMPHOCYTES ABSOLUTE: 1.8 K/UL (ref 1–5.1)
LYMPHOCYTES RELATIVE PERCENT: 14.8 %
Lab: NORMAL
MCH RBC QN AUTO: 30.7 PG (ref 26–34)
MCHC RBC AUTO-ENTMCNC: 32.4 G/DL (ref 31–36)
MCV RBC AUTO: 94.7 FL (ref 80–100)
METHADONE SCREEN, URINE: NORMAL
MONOCYTES ABSOLUTE: 0.9 K/UL (ref 0–1.3)
MONOCYTES RELATIVE PERCENT: 7.5 %
NEUTROPHILS ABSOLUTE: 9.4 K/UL (ref 1.7–7.7)
NEUTROPHILS RELATIVE PERCENT: 76.5 %
OB URINE PROTEIN DIP ONLY: NEGATIVE MG/DL
OPIATE SCREEN URINE: NORMAL
OXYCODONE URINE: NORMAL
PDW BLD-RTO: 13.3 % (ref 12.4–15.4)
PH UA: 6.5
PHENCYCLIDINE SCREEN URINE: NORMAL
PLATELET # BLD: 209 K/UL (ref 135–450)
PMV BLD AUTO: 10.1 FL (ref 5–10.5)
POTASSIUM SERPL-SCNC: 4 MMOL/L (ref 3.5–5.1)
PROPOXYPHENE SCREEN: NORMAL
RBC # BLD: 3.65 M/UL (ref 4–5.2)
SODIUM BLD-SCNC: 136 MMOL/L (ref 136–145)
TOTAL PROTEIN: 6.7 G/DL (ref 6.4–8.2)
URIC ACID, SERUM: 6.6 MG/DL (ref 2.6–6)
WBC # BLD: 12.2 K/UL (ref 4–11)

## 2018-10-18 PROCEDURE — 80307 DRUG TEST PRSMV CHEM ANLYZR: CPT

## 2018-10-18 PROCEDURE — 86850 RBC ANTIBODY SCREEN: CPT

## 2018-10-18 PROCEDURE — 84550 ASSAY OF BLOOD/URIC ACID: CPT

## 2018-10-18 PROCEDURE — 86901 BLOOD TYPING SEROLOGIC RH(D): CPT

## 2018-10-18 PROCEDURE — 81003 URINALYSIS AUTO W/O SCOPE: CPT

## 2018-10-18 PROCEDURE — 85025 COMPLETE CBC W/AUTO DIFF WBC: CPT

## 2018-10-18 PROCEDURE — 2580000003 HC RX 258

## 2018-10-18 PROCEDURE — G0378 HOSPITAL OBSERVATION PER HR: HCPCS

## 2018-10-18 PROCEDURE — 86780 TREPONEMA PALLIDUM: CPT

## 2018-10-18 PROCEDURE — 96374 THER/PROPH/DIAG INJ IV PUSH: CPT

## 2018-10-18 PROCEDURE — 80053 COMPREHEN METABOLIC PANEL: CPT

## 2018-10-18 PROCEDURE — 85384 FIBRINOGEN ACTIVITY: CPT

## 2018-10-18 PROCEDURE — 86900 BLOOD TYPING SEROLOGIC ABO: CPT

## 2018-10-18 PROCEDURE — 6370000000 HC RX 637 (ALT 250 FOR IP): Performed by: OBSTETRICS & GYNECOLOGY

## 2018-10-18 PROCEDURE — 2500000003 HC RX 250 WO HCPCS

## 2018-10-18 PROCEDURE — 2580000003 HC RX 258: Performed by: OBSTETRICS & GYNECOLOGY

## 2018-10-18 PROCEDURE — 96376 TX/PRO/DX INJ SAME DRUG ADON: CPT

## 2018-10-18 PROCEDURE — S0028 INJECTION, FAMOTIDINE, 20 MG: HCPCS

## 2018-10-18 RX ORDER — SODIUM CHLORIDE 0.9 % (FLUSH) 0.9 %
10 SYRINGE (ML) INJECTION PRN
Status: DISCONTINUED | OUTPATIENT
Start: 2018-10-18 | End: 2018-10-19 | Stop reason: SDUPTHER

## 2018-10-18 RX ORDER — SODIUM CHLORIDE 0.9 % (FLUSH) 0.9 %
SYRINGE (ML) INJECTION
Status: COMPLETED
Start: 2018-10-18 | End: 2018-10-18

## 2018-10-18 RX ORDER — ONDANSETRON 2 MG/ML
4 INJECTION INTRAMUSCULAR; INTRAVENOUS EVERY 6 HOURS PRN
Status: DISCONTINUED | OUTPATIENT
Start: 2018-10-18 | End: 2018-10-19 | Stop reason: SDUPTHER

## 2018-10-18 RX ORDER — SODIUM CHLORIDE, SODIUM LACTATE, POTASSIUM CHLORIDE, CALCIUM CHLORIDE 600; 310; 30; 20 MG/100ML; MG/100ML; MG/100ML; MG/100ML
INJECTION, SOLUTION INTRAVENOUS CONTINUOUS
Status: DISCONTINUED | OUTPATIENT
Start: 2018-10-18 | End: 2018-10-21 | Stop reason: HOSPADM

## 2018-10-18 RX ORDER — SODIUM CHLORIDE 0.9 % (FLUSH) 0.9 %
10 SYRINGE (ML) INJECTION 2 TIMES DAILY
Status: DISCONTINUED | OUTPATIENT
Start: 2018-10-18 | End: 2018-10-21 | Stop reason: HOSPADM

## 2018-10-18 RX ORDER — SODIUM CHLORIDE 0.9 % (FLUSH) 0.9 %
10 SYRINGE (ML) INJECTION EVERY 12 HOURS SCHEDULED
Status: DISCONTINUED | OUTPATIENT
Start: 2018-10-18 | End: 2018-10-19 | Stop reason: SDUPTHER

## 2018-10-18 RX ORDER — CEFAZOLIN SODIUM 2 G/100ML
2 INJECTION, SOLUTION INTRAVENOUS
Status: COMPLETED | OUTPATIENT
Start: 2018-10-18 | End: 2018-10-19

## 2018-10-18 RX ORDER — PRENATAL VIT/IRON FUM/FOLIC AC 27MG-0.8MG
1 TABLET ORAL DAILY
Status: DISCONTINUED | OUTPATIENT
Start: 2018-10-18 | End: 2018-10-21 | Stop reason: HOSPADM

## 2018-10-18 RX ORDER — ACETAMINOPHEN 500 MG
1000 TABLET ORAL ONCE
Status: COMPLETED | OUTPATIENT
Start: 2018-10-18 | End: 2018-10-18

## 2018-10-18 RX ADMIN — FAMOTIDINE 20 MG: 10 INJECTION, SOLUTION INTRAVENOUS at 17:24

## 2018-10-18 RX ADMIN — SODIUM CHLORIDE, POTASSIUM CHLORIDE, SODIUM LACTATE AND CALCIUM CHLORIDE: 600; 310; 30; 20 INJECTION, SOLUTION INTRAVENOUS at 17:16

## 2018-10-18 RX ADMIN — Medication 10 ML: at 14:05

## 2018-10-18 RX ADMIN — ACETAMINOPHEN 1000 MG: 500 TABLET ORAL at 16:23

## 2018-10-18 ASSESSMENT — PAIN SCALES - GENERAL: PAINLEVEL_OUTOF10: 3

## 2018-10-18 NOTE — H&P
Department of Obstetrics and Gynecology   Obstetrics History and Physical    HISTORY OF PRESENT ILLNESS:      The patient is a 40 y.o. [id number] at 37w1d that presents from office with elevated blood pressure. Also reports new onset frontal headache - mild, 4/10 at worst. No vision changes. No abdominal pains. Some increased swelling in extremities. Mild nausea since this am, no emesis. Was in triage 2 days ago for elevated BPs - labs normal at that time. +FM. No LOF, contractions, or VB. 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 Past Medical History:        Diagnosis Date    Anemia     states quit taking iron    Anxiety     Hypertension     admitted today for htn    Psychiatric problem      Past Surgical History:        Procedure Laterality Date    ANTERIOR CRUCIATE LIGAMENT REPAIR       SECTION       Social History:    Social History     Social History    Marital status: Single     Spouse name: N/A    Number of children: N/A    Years of education: N/A     Occupational History    Not on file.      Social History Main Topics    Smoking status: Former Smoker     Packs/day: 0.50     Years: 18.00     Types: Cigarettes     Quit date: 2018    Smokeless tobacco: Never Used    Alcohol use No    Drug use: No    Sexual activity: Yes     Partners: Male     Other Topics Concern    Not on file     Social History Narrative    No narrative on file     Family History:   Family History   Problem Relation Age of Onset    Heart Disease Father      Medications Prior to Admission:  Prescriptions Prior to Admission: ferrous

## 2018-10-18 NOTE — FLOWSHEET NOTE
Pt sent over from office for a [facility] workup. C/o headache and nausea. BLE +1 pitting edema. Serial BPs going.

## 2018-10-19 VITALS
RESPIRATION RATE: 18 BRPM | DIASTOLIC BLOOD PRESSURE: 65 MMHG | SYSTOLIC BLOOD PRESSURE: 98 MMHG | OXYGEN SATURATION: 98 %

## 2018-10-19 LAB — TOTAL SYPHILLIS IGG/IGM: NORMAL

## 2018-10-19 PROCEDURE — 6360000002 HC RX W HCPCS: Performed by: OBSTETRICS & GYNECOLOGY

## 2018-10-19 PROCEDURE — 6370000000 HC RX 637 (ALT 250 FOR IP): Performed by: OBSTETRICS & GYNECOLOGY

## 2018-10-19 PROCEDURE — 7100000000 HC PACU RECOVERY - FIRST 15 MIN: Performed by: OBSTETRICS & GYNECOLOGY

## 2018-10-19 PROCEDURE — 3700000001 HC ADD 15 MINUTES (ANESTHESIA): Performed by: OBSTETRICS & GYNECOLOGY

## 2018-10-19 PROCEDURE — 2580000003 HC RX 258: Performed by: OBSTETRICS & GYNECOLOGY

## 2018-10-19 PROCEDURE — 6360000002 HC RX W HCPCS: Performed by: NURSE ANESTHETIST, CERTIFIED REGISTERED

## 2018-10-19 PROCEDURE — G0378 HOSPITAL OBSERVATION PER HR: HCPCS

## 2018-10-19 PROCEDURE — 2709999900 HC NON-CHARGEABLE SUPPLY: Performed by: OBSTETRICS & GYNECOLOGY

## 2018-10-19 PROCEDURE — 3700000000 HC ANESTHESIA ATTENDED CARE: Performed by: OBSTETRICS & GYNECOLOGY

## 2018-10-19 PROCEDURE — 7100000001 HC PACU RECOVERY - ADDTL 15 MIN: Performed by: OBSTETRICS & GYNECOLOGY

## 2018-10-19 PROCEDURE — 2500000003 HC RX 250 WO HCPCS: Performed by: OBSTETRICS & GYNECOLOGY

## 2018-10-19 PROCEDURE — 2500000003 HC RX 250 WO HCPCS: Performed by: NURSE ANESTHETIST, CERTIFIED REGISTERED

## 2018-10-19 PROCEDURE — 3609079900 HC CESAREAN SECTION: Performed by: OBSTETRICS & GYNECOLOGY

## 2018-10-19 PROCEDURE — S0028 INJECTION, FAMOTIDINE, 20 MG: HCPCS | Performed by: OBSTETRICS & GYNECOLOGY

## 2018-10-19 RX ORDER — OXYCODONE HYDROCHLORIDE 5 MG/1
5 TABLET ORAL EVERY 4 HOURS PRN
Status: DISCONTINUED | OUTPATIENT
Start: 2018-10-19 | End: 2018-10-21 | Stop reason: HOSPADM

## 2018-10-19 RX ORDER — TRISODIUM CITRATE DIHYDRATE AND CITRIC ACID MONOHYDRATE 500; 334 MG/5ML; MG/5ML
SOLUTION ORAL PRN
Status: DISCONTINUED | OUTPATIENT
Start: 2018-10-19 | End: 2018-10-19 | Stop reason: HOSPADM

## 2018-10-19 RX ORDER — FAMOTIDINE 20 MG/1
20 TABLET, FILM COATED ORAL 2 TIMES DAILY PRN
Status: DISCONTINUED | OUTPATIENT
Start: 2018-10-19 | End: 2018-10-21 | Stop reason: HOSPADM

## 2018-10-19 RX ORDER — ONDANSETRON 2 MG/ML
INJECTION INTRAMUSCULAR; INTRAVENOUS PRN
Status: DISCONTINUED | OUTPATIENT
Start: 2018-10-19 | End: 2018-10-19 | Stop reason: SDUPTHER

## 2018-10-19 RX ORDER — ONDANSETRON 8 MG/1
8 TABLET, ORALLY DISINTEGRATING ORAL EVERY 8 HOURS PRN
Status: DISCONTINUED | OUTPATIENT
Start: 2018-10-19 | End: 2018-10-21 | Stop reason: HOSPADM

## 2018-10-19 RX ORDER — IBUPROFEN 800 MG/1
800 TABLET ORAL EVERY 6 HOURS PRN
Qty: 60 TABLET | Refills: 0 | Status: SHIPPED | OUTPATIENT
Start: 2018-10-19

## 2018-10-19 RX ORDER — ONDANSETRON 2 MG/ML
4 INJECTION INTRAMUSCULAR; INTRAVENOUS EVERY 6 HOURS PRN
Status: DISCONTINUED | OUTPATIENT
Start: 2018-10-19 | End: 2018-10-21 | Stop reason: HOSPADM

## 2018-10-19 RX ORDER — ACETAMINOPHEN 500 MG
1000 TABLET ORAL EVERY 6 HOURS SCHEDULED
Status: DISCONTINUED | OUTPATIENT
Start: 2018-10-19 | End: 2018-10-21 | Stop reason: HOSPADM

## 2018-10-19 RX ORDER — KETOROLAC TROMETHAMINE 30 MG/ML
INJECTION, SOLUTION INTRAMUSCULAR; INTRAVENOUS PRN
Status: DISCONTINUED | OUTPATIENT
Start: 2018-10-19 | End: 2018-10-19 | Stop reason: SDUPTHER

## 2018-10-19 RX ORDER — DIPHENHYDRAMINE HYDROCHLORIDE 50 MG/ML
25 INJECTION INTRAMUSCULAR; INTRAVENOUS EVERY 6 HOURS PRN
Status: DISCONTINUED | OUTPATIENT
Start: 2018-10-19 | End: 2018-10-21 | Stop reason: HOSPADM

## 2018-10-19 RX ORDER — DEXAMETHASONE SODIUM PHOSPHATE 10 MG/ML
INJECTION, SOLUTION INTRAMUSCULAR; INTRAVENOUS PRN
Status: DISCONTINUED | OUTPATIENT
Start: 2018-10-19 | End: 2018-10-19 | Stop reason: SDUPTHER

## 2018-10-19 RX ORDER — FENTANYL CITRATE 50 UG/ML
INJECTION, SOLUTION INTRAMUSCULAR; INTRAVENOUS PRN
Status: DISCONTINUED | OUTPATIENT
Start: 2018-10-19 | End: 2018-10-19 | Stop reason: SDUPTHER

## 2018-10-19 RX ORDER — CARBOPROST TROMETHAMINE 250 UG/ML
250 INJECTION, SOLUTION INTRAMUSCULAR PRN
Status: DISCONTINUED | OUTPATIENT
Start: 2018-10-19 | End: 2018-10-21 | Stop reason: HOSPADM

## 2018-10-19 RX ORDER — LANOLIN 100 %
OINTMENT (GRAM) TOPICAL
Status: DISCONTINUED | OUTPATIENT
Start: 2018-10-19 | End: 2018-10-21 | Stop reason: HOSPADM

## 2018-10-19 RX ORDER — CEFAZOLIN SODIUM 2 G/100ML
INJECTION, SOLUTION INTRAVENOUS CONTINUOUS PRN
Status: DISCONTINUED | OUTPATIENT
Start: 2018-10-19 | End: 2018-10-19 | Stop reason: HOSPADM

## 2018-10-19 RX ORDER — METOCLOPRAMIDE HYDROCHLORIDE 5 MG/ML
INJECTION INTRAMUSCULAR; INTRAVENOUS PRN
Status: DISCONTINUED | OUTPATIENT
Start: 2018-10-19 | End: 2018-10-19 | Stop reason: HOSPADM

## 2018-10-19 RX ORDER — SENNA AND DOCUSATE SODIUM 50; 8.6 MG/1; MG/1
1 TABLET, FILM COATED ORAL DAILY PRN
Status: DISCONTINUED | OUTPATIENT
Start: 2018-10-19 | End: 2018-10-21 | Stop reason: HOSPADM

## 2018-10-19 RX ORDER — TRISODIUM CITRATE DIHYDRATE AND CITRIC ACID MONOHYDRATE 500; 334 MG/5ML; MG/5ML
30 SOLUTION ORAL ONCE
Status: COMPLETED | OUTPATIENT
Start: 2018-10-19 | End: 2018-10-19

## 2018-10-19 RX ORDER — OXYCODONE HYDROCHLORIDE 5 MG/1
5 TABLET ORAL EVERY 6 HOURS PRN
Qty: 28 TABLET | Refills: 0 | Status: SHIPPED | OUTPATIENT
Start: 2018-10-19 | End: 2018-10-26

## 2018-10-19 RX ORDER — FERROUS SULFATE 325(65) MG
325 TABLET ORAL 2 TIMES DAILY WITH MEALS
Status: DISCONTINUED | OUTPATIENT
Start: 2018-10-19 | End: 2018-10-21 | Stop reason: HOSPADM

## 2018-10-19 RX ORDER — OXYTOCIN 10 [USP'U]/ML
INJECTION, SOLUTION INTRAMUSCULAR; INTRAVENOUS PRN
Status: DISCONTINUED | OUTPATIENT
Start: 2018-10-19 | End: 2018-10-19 | Stop reason: SDUPTHER

## 2018-10-19 RX ORDER — MORPHINE SULFATE 0.5 MG/ML
INJECTION, SOLUTION EPIDURAL; INTRATHECAL; INTRAVENOUS PRN
Status: DISCONTINUED | OUTPATIENT
Start: 2018-10-19 | End: 2018-10-19 | Stop reason: SDUPTHER

## 2018-10-19 RX ORDER — BUPIVACAINE HYDROCHLORIDE 7.5 MG/ML
INJECTION, SOLUTION INTRASPINAL PRN
Status: DISCONTINUED | OUTPATIENT
Start: 2018-10-19 | End: 2018-10-19 | Stop reason: SDUPTHER

## 2018-10-19 RX ORDER — SODIUM CHLORIDE 0.9 % (FLUSH) 0.9 %
10 SYRINGE (ML) INJECTION PRN
Status: DISCONTINUED | OUTPATIENT
Start: 2018-10-19 | End: 2018-10-21 | Stop reason: HOSPADM

## 2018-10-19 RX ORDER — IBUPROFEN 600 MG/1
600 TABLET ORAL EVERY 6 HOURS SCHEDULED
Status: DISCONTINUED | OUTPATIENT
Start: 2018-10-19 | End: 2018-10-21 | Stop reason: HOSPADM

## 2018-10-19 RX ORDER — MISOPROSTOL 100 UG/1
800 TABLET ORAL PRN
Status: DISCONTINUED | OUTPATIENT
Start: 2018-10-19 | End: 2018-10-21 | Stop reason: HOSPADM

## 2018-10-19 RX ORDER — SODIUM CHLORIDE 0.9 % (FLUSH) 0.9 %
10 SYRINGE (ML) INJECTION EVERY 12 HOURS SCHEDULED
Status: DISCONTINUED | OUTPATIENT
Start: 2018-10-19 | End: 2018-10-21 | Stop reason: HOSPADM

## 2018-10-19 RX ORDER — BISACODYL 10 MG
10 SUPPOSITORY, RECTAL RECTAL DAILY PRN
Status: DISCONTINUED | OUTPATIENT
Start: 2018-10-19 | End: 2018-10-21 | Stop reason: HOSPADM

## 2018-10-19 RX ORDER — METOCLOPRAMIDE HYDROCHLORIDE 5 MG/ML
10 INJECTION INTRAMUSCULAR; INTRAVENOUS ONCE
Status: COMPLETED | OUTPATIENT
Start: 2018-10-19 | End: 2018-10-19

## 2018-10-19 RX ORDER — SODIUM CHLORIDE, SODIUM LACTATE, POTASSIUM CHLORIDE, CALCIUM CHLORIDE 600; 310; 30; 20 MG/100ML; MG/100ML; MG/100ML; MG/100ML
INJECTION, SOLUTION INTRAVENOUS CONTINUOUS
Status: DISCONTINUED | OUTPATIENT
Start: 2018-10-19 | End: 2018-10-21 | Stop reason: HOSPADM

## 2018-10-19 RX ORDER — DOCUSATE SODIUM 100 MG/1
100 CAPSULE, LIQUID FILLED ORAL 2 TIMES DAILY
Status: DISCONTINUED | OUTPATIENT
Start: 2018-10-19 | End: 2018-10-21 | Stop reason: HOSPADM

## 2018-10-19 RX ORDER — KETOROLAC TROMETHAMINE 30 MG/ML
30 INJECTION, SOLUTION INTRAMUSCULAR; INTRAVENOUS EVERY 6 HOURS PRN
Status: DISPENSED | OUTPATIENT
Start: 2018-10-19 | End: 2018-10-21

## 2018-10-19 RX ORDER — ACETAMINOPHEN 500 MG
1000 TABLET ORAL ONCE
Status: COMPLETED | OUTPATIENT
Start: 2018-10-19 | End: 2018-10-19

## 2018-10-19 RX ORDER — SIMETHICONE 80 MG
80 TABLET,CHEWABLE ORAL EVERY 6 HOURS PRN
Status: DISCONTINUED | OUTPATIENT
Start: 2018-10-19 | End: 2018-10-21 | Stop reason: HOSPADM

## 2018-10-19 RX ADMIN — ONDANSETRON 4 MG: 2 INJECTION INTRAMUSCULAR; INTRAVENOUS at 10:45

## 2018-10-19 RX ADMIN — SODIUM CHLORIDE, POTASSIUM CHLORIDE, SODIUM LACTATE AND CALCIUM CHLORIDE: 600; 310; 30; 20 INJECTION, SOLUTION INTRAVENOUS at 10:50

## 2018-10-19 RX ADMIN — ACETAMINOPHEN 1000 MG: 500 TABLET, FILM COATED ORAL at 22:30

## 2018-10-19 RX ADMIN — KETOROLAC TROMETHAMINE 30 MG: 30 INJECTION, SOLUTION INTRAMUSCULAR at 18:04

## 2018-10-19 RX ADMIN — MORPHINE SULFATE 0.2 MG: 0.5 INJECTION EPIDURAL; INTRATHECAL; INTRAVENOUS at 10:41

## 2018-10-19 RX ADMIN — OXYTOCIN 20 UNITS: 10 INJECTION INTRAVENOUS at 10:56

## 2018-10-19 RX ADMIN — SODIUM CHLORIDE, POTASSIUM CHLORIDE, SODIUM LACTATE AND CALCIUM CHLORIDE: 600; 310; 30; 20 INJECTION, SOLUTION INTRAVENOUS at 18:56

## 2018-10-19 RX ADMIN — FAMOTIDINE 20 MG: 10 INJECTION, SOLUTION INTRAVENOUS at 09:25

## 2018-10-19 RX ADMIN — DOCUSATE SODIUM 100 MG: 100 CAPSULE, LIQUID FILLED ORAL at 21:14

## 2018-10-19 RX ADMIN — SODIUM CITRATE AND CITRIC ACID MONOHYDRATE 30 ML: 500; 334 SOLUTION ORAL at 09:25

## 2018-10-19 RX ADMIN — SODIUM CHLORIDE, PRESERVATIVE FREE 10 ML: 5 INJECTION INTRAVENOUS at 09:26

## 2018-10-19 RX ADMIN — METOCLOPRAMIDE 10 MG: 5 INJECTION, SOLUTION INTRAMUSCULAR; INTRAVENOUS at 09:25

## 2018-10-19 RX ADMIN — DEXAMETHASONE SODIUM PHOSPHATE 8 MG: 10 INJECTION, SOLUTION INTRAMUSCULAR; INTRAVENOUS at 11:01

## 2018-10-19 RX ADMIN — KETOROLAC TROMETHAMINE 30 MG: 30 INJECTION, SOLUTION INTRAMUSCULAR; INTRAVENOUS at 11:01

## 2018-10-19 RX ADMIN — BUPIVACAINE HYDROCHLORIDE IN DEXTROSE 1.2 ML: 7.5 INJECTION, SOLUTION SUBARACHNOID at 10:41

## 2018-10-19 RX ADMIN — FENTANYL CITRATE 10 MCG: 50 INJECTION, SOLUTION INTRAMUSCULAR; INTRAVENOUS at 10:41

## 2018-10-19 RX ADMIN — SODIUM CHLORIDE, POTASSIUM CHLORIDE, SODIUM LACTATE AND CALCIUM CHLORIDE: 600; 310; 30; 20 INJECTION, SOLUTION INTRAVENOUS at 04:46

## 2018-10-19 RX ADMIN — ACETAMINOPHEN 1000 MG: 500 TABLET ORAL at 09:26

## 2018-10-19 RX ADMIN — ACETAMINOPHEN 1000 MG: 500 TABLET, FILM COATED ORAL at 15:44

## 2018-10-19 RX ADMIN — CEFAZOLIN SODIUM 2 G: 2 INJECTION, SOLUTION INTRAVENOUS at 09:26

## 2018-10-19 RX ADMIN — SODIUM CHLORIDE, POTASSIUM CHLORIDE, SODIUM LACTATE AND CALCIUM CHLORIDE: 600; 310; 30; 20 INJECTION, SOLUTION INTRAVENOUS at 11:22

## 2018-10-19 RX ADMIN — OXYTOCIN 20 UNITS: 10 INJECTION INTRAVENOUS at 11:22

## 2018-10-19 ASSESSMENT — PULMONARY FUNCTION TESTS
PIF_VALUE: 0

## 2018-10-19 ASSESSMENT — PAIN SCALES - GENERAL
PAINLEVEL_OUTOF10: 7
PAINLEVEL_OUTOF10: 5
PAINLEVEL_OUTOF10: 5

## 2018-10-19 NOTE — PROCEDURES
FETAL SURVEILLANCE TESTING SUMMARY  dimitrios Amado 10/18/18    INDICATIONS:  pregnancy-induced hypertension    OBJECTIVE RESULTS:  Fetal heart variability: moderate  Fetal Heart Rate decelerations: none  Fetal Heart Rate accelerations: yes  Baseline FHR: 130s per minute  Fetal Non-stress Test: reactive    Fetal surveillance: reassuring

## 2018-10-20 LAB
HCT VFR BLD CALC: 25.5 % (ref 36–48)
HEMOGLOBIN: 8.6 G/DL (ref 12–16)
MCH RBC QN AUTO: 31.9 PG (ref 26–34)
MCHC RBC AUTO-ENTMCNC: 33.6 G/DL (ref 31–36)
MCV RBC AUTO: 95.1 FL (ref 80–100)
PDW BLD-RTO: 13.4 % (ref 12.4–15.4)
PLATELET # BLD: 179 K/UL (ref 135–450)
PMV BLD AUTO: 9.6 FL (ref 5–10.5)
RBC # BLD: 2.68 M/UL (ref 4–5.2)
WBC # BLD: 16.1 K/UL (ref 4–11)

## 2018-10-20 PROCEDURE — 6370000000 HC RX 637 (ALT 250 FOR IP): Performed by: OBSTETRICS & GYNECOLOGY

## 2018-10-20 PROCEDURE — 96376 TX/PRO/DX INJ SAME DRUG ADON: CPT

## 2018-10-20 PROCEDURE — 2580000003 HC RX 258: Performed by: OBSTETRICS & GYNECOLOGY

## 2018-10-20 PROCEDURE — 6360000002 HC RX W HCPCS: Performed by: OBSTETRICS & GYNECOLOGY

## 2018-10-20 PROCEDURE — 96375 TX/PRO/DX INJ NEW DRUG ADDON: CPT

## 2018-10-20 PROCEDURE — G0378 HOSPITAL OBSERVATION PER HR: HCPCS

## 2018-10-20 PROCEDURE — 90471 IMMUNIZATION ADMIN: CPT | Performed by: OBSTETRICS & GYNECOLOGY

## 2018-10-20 PROCEDURE — 90715 TDAP VACCINE 7 YRS/> IM: CPT | Performed by: OBSTETRICS & GYNECOLOGY

## 2018-10-20 PROCEDURE — 85027 COMPLETE CBC AUTOMATED: CPT

## 2018-10-20 RX ADMIN — PRENATAL VIT W/ FE FUMARATE-FA TAB 27-0.8 MG 1 TABLET: 27-0.8 TAB at 09:32

## 2018-10-20 RX ADMIN — IBUPROFEN 600 MG: 600 TABLET ORAL at 18:30

## 2018-10-20 RX ADMIN — FERROUS SULFATE TAB 325 MG (65 MG ELEMENTAL FE) 325 MG: 325 (65 FE) TAB at 09:30

## 2018-10-20 RX ADMIN — ACETAMINOPHEN 1000 MG: 500 TABLET, FILM COATED ORAL at 03:56

## 2018-10-20 RX ADMIN — SODIUM CHLORIDE, POTASSIUM CHLORIDE, SODIUM LACTATE AND CALCIUM CHLORIDE: 600; 310; 30; 20 INJECTION, SOLUTION INTRAVENOUS at 03:57

## 2018-10-20 RX ADMIN — IBUPROFEN 600 MG: 600 TABLET ORAL at 12:59

## 2018-10-20 RX ADMIN — TETANUS TOXOID, REDUCED DIPHTHERIA TOXOID AND ACELLULAR PERTUSSIS VACCINE, ADSORBED 0.5 ML: 5; 2.5; 8; 8; 2.5 SUSPENSION INTRAMUSCULAR at 17:28

## 2018-10-20 RX ADMIN — DOCUSATE SODIUM 100 MG: 100 CAPSULE, LIQUID FILLED ORAL at 21:55

## 2018-10-20 RX ADMIN — KETOROLAC TROMETHAMINE 30 MG: 30 INJECTION, SOLUTION INTRAMUSCULAR at 06:20

## 2018-10-20 RX ADMIN — FERROUS SULFATE TAB 325 MG (65 MG ELEMENTAL FE) 325 MG: 325 (65 FE) TAB at 18:30

## 2018-10-20 RX ADMIN — Medication 10 ML: at 09:31

## 2018-10-20 RX ADMIN — KETOROLAC TROMETHAMINE 30 MG: 30 INJECTION, SOLUTION INTRAMUSCULAR at 00:24

## 2018-10-20 RX ADMIN — ACETAMINOPHEN 1000 MG: 500 TABLET, FILM COATED ORAL at 17:26

## 2018-10-20 RX ADMIN — DOCUSATE SODIUM 100 MG: 100 CAPSULE, LIQUID FILLED ORAL at 09:30

## 2018-10-20 RX ADMIN — ACETAMINOPHEN 1000 MG: 500 TABLET, FILM COATED ORAL at 23:33

## 2018-10-20 RX ADMIN — OXYCODONE HYDROCHLORIDE 5 MG: 5 TABLET ORAL at 18:56

## 2018-10-20 ASSESSMENT — PAIN SCALES - GENERAL
PAINLEVEL_OUTOF10: 7
PAINLEVEL_OUTOF10: 6
PAINLEVEL_OUTOF10: 4
PAINLEVEL_OUTOF10: 4
PAINLEVEL_OUTOF10: 5
PAINLEVEL_OUTOF10: 6
PAINLEVEL_OUTOF10: 4
PAINLEVEL_OUTOF10: 6

## 2018-10-20 NOTE — PLAN OF CARE
Problem: Pain:  Goal: Pain level will decrease  Pain level will decrease    Outcome: Met This Shift      Problem: Anxiety:  Goal: Level of anxiety will decrease  Level of anxiety will decrease   Outcome: Met This Shift      Problem: Aspiration - Risk of:  Goal: Absence of aspiration  Absence of aspiration   Outcome: Met This Shift      Problem: Fluid Volume - Imbalance:  Goal: Absence of postpartum hemorrhage signs and symptoms  Absence of postpartum hemorrhage signs and symptoms   Outcome: Met This Shift      Problem: Infection - Surgical Site:  Goal: Will show no infection signs and symptoms  Will show no infection signs and symptoms   Outcome: Met This Shift      Problem: Mood - Altered:  Goal: Mood stable  Mood stable   Outcome: Met This Shift      Problem: Nausea/Vomiting:  Goal: Absence of nausea/vomiting  Absence of nausea/vomiting   Outcome: Met This Shift      Problem: Pain - Acute:  Goal: Pain level will decrease  Pain level will decrease    Outcome: Met This Shift      Problem: Urinary Retention:  Goal: Urinary elimination within specified parameters  Urinary elimination within specified parameters   Outcome: Met This Shift      Problem: Venous Thromboembolism:  Goal: Absence of signs or symptoms of impaired coagulation  Absence of signs or symptoms of impaired coagulation   Outcome: Met This Shift

## 2018-10-21 VITALS
SYSTOLIC BLOOD PRESSURE: 151 MMHG | RESPIRATION RATE: 16 BRPM | HEART RATE: 80 BPM | HEIGHT: 60 IN | WEIGHT: 192 LBS | BODY MASS INDEX: 37.69 KG/M2 | TEMPERATURE: 98.4 F | DIASTOLIC BLOOD PRESSURE: 98 MMHG | OXYGEN SATURATION: 98 %

## 2018-10-21 PROCEDURE — 6370000000 HC RX 637 (ALT 250 FOR IP): Performed by: OBSTETRICS & GYNECOLOGY

## 2018-10-21 PROCEDURE — G0378 HOSPITAL OBSERVATION PER HR: HCPCS

## 2018-10-21 RX ADMIN — DOCUSATE SODIUM 100 MG: 100 CAPSULE, LIQUID FILLED ORAL at 09:52

## 2018-10-21 RX ADMIN — IBUPROFEN 600 MG: 600 TABLET ORAL at 09:51

## 2018-10-21 RX ADMIN — IBUPROFEN 600 MG: 600 TABLET ORAL at 01:00

## 2018-10-21 RX ADMIN — PRENATAL VIT W/ FE FUMARATE-FA TAB 27-0.8 MG 1 TABLET: 27-0.8 TAB at 09:52

## 2018-10-21 RX ADMIN — FERROUS SULFATE TAB 325 MG (65 MG ELEMENTAL FE) 325 MG: 325 (65 FE) TAB at 09:52

## 2018-10-21 RX ADMIN — ACETAMINOPHEN 1000 MG: 500 TABLET, FILM COATED ORAL at 12:49

## 2018-10-21 RX ADMIN — OXYCODONE HYDROCHLORIDE 5 MG: 5 TABLET ORAL at 12:49

## 2018-10-21 ASSESSMENT — PAIN SCALES - GENERAL
PAINLEVEL_OUTOF10: 7
PAINLEVEL_OUTOF10: 6
PAINLEVEL_OUTOF10: 7

## 2019-05-06 ENCOUNTER — APPOINTMENT (OUTPATIENT)
Dept: GENERAL RADIOLOGY | Age: 39
End: 2019-05-06
Payer: COMMERCIAL

## 2019-05-06 ENCOUNTER — HOSPITAL ENCOUNTER (EMERGENCY)
Age: 39
Discharge: HOME | End: 2019-05-06
Attending: EMERGENCY MEDICINE
Payer: COMMERCIAL

## 2019-05-06 VITALS
OXYGEN SATURATION: 98 % | HEART RATE: 86 BPM | TEMPERATURE: 98.6 F | DIASTOLIC BLOOD PRESSURE: 88 MMHG | BODY MASS INDEX: 33.93 KG/M2 | SYSTOLIC BLOOD PRESSURE: 144 MMHG | RESPIRATION RATE: 16 BRPM | HEIGHT: 60 IN | WEIGHT: 172.84 LBS
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
## 2019-05-06 DIAGNOSIS — S60.00XA CONTUSION OF FINGER OF RIGHT HAND, INITIAL ENCOUNTER: ICD-10-CM

## 2019-05-06 DIAGNOSIS — S90.02XA CONTUSION OF LEFT ANKLE, INITIAL ENCOUNTER: ICD-10-CM

## 2019-05-06 PROCEDURE — 73130 X-RAY EXAM OF HAND: CPT

## 2019-05-06 PROCEDURE — 73630 X-RAY EXAM OF FOOT: CPT

## 2019-05-06 PROCEDURE — 99284 EMERGENCY DEPT VISIT MOD MDM: CPT

## 2019-05-06 ASSESSMENT — PAIN DESCRIPTION - PROGRESSION: CLINICAL_PROGRESSION: NOT CHANGED

## 2019-05-06 ASSESSMENT — PAIN DESCRIPTION - PAIN TYPE
TYPE: ACUTE PAIN
TYPE: ACUTE PAIN

## 2019-05-06 ASSESSMENT — PAIN DESCRIPTION - LOCATION: LOCATION: WRIST

## 2019-05-06 ASSESSMENT — PAIN DESCRIPTION - ORIENTATION: ORIENTATION: RIGHT

## 2019-05-06 ASSESSMENT — PAIN SCALES - GENERAL
PAINLEVEL_OUTOF10: 4

## 2019-05-06 ASSESSMENT — PAIN DESCRIPTION - DESCRIPTORS
DESCRIPTORS: ACHING
DESCRIPTORS: ACHING

## 2019-05-06 ASSESSMENT — PAIN DESCRIPTION - FREQUENCY: FREQUENCY: CONTINUOUS

## 2019-05-06 ASSESSMENT — PAIN - FUNCTIONAL ASSESSMENT: PAIN_FUNCTIONAL_ASSESSMENT: ACTIVITIES ARE NOT PREVENTED

## 2019-05-06 ASSESSMENT — PAIN DESCRIPTION - ONSET: ONSET: SUDDEN
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
## 2021-04-14 ENCOUNTER — OFFICE VISIT (OUTPATIENT)
Dept: FAMILY MEDICINE CLINIC | Age: 41
End: 2021-04-14

## 2021-04-14 VITALS
TEMPERATURE: 97.8 F | OXYGEN SATURATION: 99 % | HEART RATE: 74 BPM | HEIGHT: 60 IN | SYSTOLIC BLOOD PRESSURE: 124 MMHG | WEIGHT: 174.8 LBS | BODY MASS INDEX: 34.32 KG/M2 | DIASTOLIC BLOOD PRESSURE: 74 MMHG

## 2021-04-14 DIAGNOSIS — Z76.89 ENCOUNTER TO ESTABLISH CARE: ICD-10-CM

## 2021-04-14 DIAGNOSIS — F31.73 BIPOLAR DISORDER, IN PARTIAL REMISSION, MOST RECENT EPISODE MANIC (HCC): Primary | ICD-10-CM

## 2021-04-14 DIAGNOSIS — F10.20 ALCOHOLISM (HCC): ICD-10-CM

## 2021-04-14 PROCEDURE — 99204 OFFICE O/P NEW MOD 45 MIN: CPT | Performed by: NURSE PRACTITIONER

## 2021-04-14 RX ORDER — ZIPRASIDONE HYDROCHLORIDE 60 MG/1
60 CAPSULE ORAL
Qty: 90 CAPSULE | Refills: 1 | Status: SHIPPED | OUTPATIENT
Start: 2021-04-14 | End: 2021-10-14

## 2021-04-14 RX ORDER — ZIPRASIDONE HYDROCHLORIDE 80 MG/1
80 CAPSULE ORAL DAILY
COMMUNITY
Start: 2021-03-12 | End: 2021-04-14 | Stop reason: SDUPTHER

## 2021-04-14 RX ORDER — ZIPRASIDONE HYDROCHLORIDE 60 MG/1
60 CAPSULE ORAL DAILY
COMMUNITY
Start: 2021-03-12 | End: 2021-04-14 | Stop reason: SDUPTHER

## 2021-04-14 RX ORDER — ZIPRASIDONE HYDROCHLORIDE 80 MG/1
80 CAPSULE ORAL 2 TIMES DAILY WITH MEALS
Qty: 180 CAPSULE | Refills: 1 | Status: SHIPPED | OUTPATIENT
Start: 2021-04-14 | End: 2021-08-12 | Stop reason: SDUPTHER

## 2021-04-14 ASSESSMENT — ENCOUNTER SYMPTOMS
COUGH: 0
SINUS PAIN: 0
ABDOMINAL DISTENTION: 0
WHEEZING: 0
EYE REDNESS: 0
EYE PAIN: 0
NAUSEA: 0
EYE DISCHARGE: 0
SINUS PRESSURE: 0
SHORTNESS OF BREATH: 0
DIARRHEA: 0
VOMITING: 0
SORE THROAT: 0
ABDOMINAL PAIN: 0

## 2021-04-14 ASSESSMENT — PATIENT HEALTH QUESTIONNAIRE - PHQ9
SUM OF ALL RESPONSES TO PHQ QUESTIONS 1-9: 0
1. LITTLE INTEREST OR PLEASURE IN DOING THINGS: 0
SUM OF ALL RESPONSES TO PHQ QUESTIONS 1-9: 0
2. FEELING DOWN, DEPRESSED OR HOPELESS: 0

## 2021-04-14 NOTE — PROGRESS NOTES
2021    Yaonna Montejo (:  1980) is a 36 y.o. female, here for a preventive medicine evaluation. Brittney Jackson presents today with her mom to establish care. Her main inquiry resolves around her bipolar disorder. She states that she was recently discharged from inpatient alcohol rehab mental health. Was at [facility] as well as a facility in [place]. She states that she has been sober for 53 days. Prior to being sober she drank 5 tall boys of shalini 45 daily. While she was in treatment, she was started on Geodon. She states that she takes 60 mg at breakfast, 80 mg at lunch, and 80 mg at dinner. She states that this dose has worked very well for her. She states that it is the first medication that has ever worked well at managing her symptoms. The facility will no longer manage this medication, and she was inquiring whether we could start managing this. She states that she did run out of the medication yesterday and is starting to experience symptoms of withdrawal.  She has been experiencing headaches and vomiting. They attempted to contact the rehab facility in order to get a refill, but the facility would not do this. She states that she was going to be given the IM injection for alcohol abuse, but her liver enzymes were elevated. She does not feel that she needs this at this time but wanted to inform us of this. Otherwise she only takes ibuprofen as needed. [place] is moving back up to [place]. She has a 3year-old son. She had another child who committed suicide in November. She states that she is still having anxiety and trying to cope with this at times. She would like to avoid starting any another medication for this and feels that she will be able to cope with it on its own once back on the Geodon.     Patient Active Problem List   Diagnosis    Bipolar disorder ([facility] [address]    40 y.o.     Uterine anomaly    H/O:     Hypertension affecting pregnancy in third trimester  Gestational hypertension w/o significant proteinuria in 3rd trimester     delivery delivered       Review of Systems   Constitutional: Negative for chills and fever. HENT: Negative for ear discharge, ear pain, hearing loss, sinus pressure, sinus pain and sore throat. Eyes: Negative for pain, discharge and redness. Respiratory: Negative for cough, shortness of breath and wheezing. Cardiovascular: Negative for chest pain, palpitations and leg swelling. Gastrointestinal: Negative for abdominal distention, abdominal pain, diarrhea, nausea and vomiting. Genitourinary: Negative for dysuria and hematuria. Musculoskeletal: Negative for myalgias. Skin: Negative for rash. Neurological: Positive for headaches. Negative for dizziness, weakness, light-headedness and numbness. Psychiatric/Behavioral: Negative for decreased concentration, dysphoric mood and sleep disturbance. The patient is nervous/anxious. Prior to Visit Medications    Medication Sig Taking? Authorizing Provider   ziprasidone (GEODON) 60 MG capsule Take 1 capsule by mouth daily (with breakfast) Yes DANELLE Trotter CNP   ziprasidone (GEODON) 80 MG capsule Take 1 capsule by mouth 2 times daily (with meals) 60 mg tablet in the morning. 80 mg tablet with lunch and with dinner.  Yes DANELLE Trotter CNP   ibuprofen (ADVIL;MOTRIN) 800 MG tablet Take 1 tablet by mouth every 6 hours as needed for Pain Yes Tyler Martin MD        Allergies   Allergen Reactions    Codeine Rash       Past Medical History:   Diagnosis Date    Anemia     states quit taking iron    Anxiety     Bipolar disorder ([facility] [address]     Hypertension     admitted today for htn    Psychiatric problem     Substance abuse ([facility] [address]     Alcoholism       Past Surgical History:   Procedure Laterality Date    ANTERIOR CRUCIATE LIGAMENT REPAIR       SECTION       SECTION, LOW TRANSVERSE  10/19/2018    C/S done for PIH at 37 weeks     RI  DELIVERY ONLY N/A 10/19/2018     SECTION performed by Praful Cowan MD at [facility] L&D OR       Social History     Socioeconomic History    Marital status:      Spouse name: Not on file    Number of children: Not on file    Years of education: Not on file    Highest education level: Not on file   Occupational History    Not on file   Social Needs    Financial resource strain: Not hard at all    Food insecurity     Worry: Never true     Inability: Never true   [employer] needs     Medical: No     Non-medical: No   Tobacco Use    Smoking status: Former Smoker     Packs/day: 0.50     Years: 18.00     Pack years: 9.00     Types: Cigarettes     Quit date: 2018     Years since quitting: 3.2    Smokeless tobacco: Never Used   Substance and Sexual Activity    Alcohol use: No    Drug use: No     Comment: previous    Sexual activity: Yes     Partners: Male   Lifestyle    Physical activity     Days per week: Not on file     Minutes per session: Not on file    Stress: Not on file   Relationships    Social connections     Talks on phone: Not on file     Gets together: Not on file     Attends Yarsani service: Not on file     Active member of club or organization: Not on file     Attends meetings of clubs or organizations: Not on file     Relationship status: Not on file    Intimate partner violence     Fear of current or ex partner: Not on file     Emotionally abused: Not on file     Physically abused: Not on file     Forced sexual activity: Not on file   Other Topics Concern    Not on file   Social History Narrative    Not on file        Family History   Problem Relation Age of Onset    High Cholesterol Mother     Heart Disease Father     Diabetes Father     Cancer Sister         Non-hodgkins       ADVANCE DIRECTIVE: N, <no information>    Vitals:    21 1615   BP: 124/74   Site: Left Upper Arm   Position: Sitting   Cuff Size: Medium Adult   Pulse: 74   Temp: 97.8 °F (36.6 °C)   TempSrc: Temporal   SpO2: 99%   Weight: 174 lb 12.8 oz (79.3 kg)  Comment: SHOES ON   Height: 5' (1.524 m)     Estimated body mass index is 34.14 kg/m² as calculated from the following:    Height as of this encounter: 5' (1.524 m). Weight as of this encounter: 174 lb 12.8 oz (79.3 kg). Physical Exam  Constitutional:       Appearance: Normal appearance. She is normal weight. HENT:      Head: Normocephalic and atraumatic. Right Ear: Tympanic membrane, ear canal and external ear normal.      Left Ear: Tympanic membrane, ear canal and external ear normal.      Mouth/Throat:      Mouth: Mucous membranes are moist.   Eyes:      Extraocular Movements: Extraocular movements intact. Conjunctiva/sclera: Conjunctivae normal.      Pupils: Pupils are equal, round, and reactive to light. Neck:      Musculoskeletal: Normal range of motion and neck supple. Thyroid: No thyromegaly. Cardiovascular:      Rate and Rhythm: Normal rate and regular rhythm. Pulses: Normal pulses. Heart sounds: Normal heart sounds. No murmur. No gallop. Pulmonary:      Effort: Pulmonary effort is normal.      Breath sounds: Normal breath sounds. Abdominal:      General: Bowel sounds are normal.      Palpations: Abdomen is soft. Tenderness: There is no abdominal tenderness. There is no guarding or rebound. Musculoskeletal: Normal range of motion. Lymphadenopathy:      Cervical: No cervical adenopathy. Skin:     General: Skin is warm and dry. Capillary Refill: Capillary refill takes less than 2 seconds. Neurological:      Mental Status: She is alert and oriented to person, place, and time. Psychiatric:         Mood and Affect: Mood normal.         Behavior: Behavior normal.         Thought Content: Thought content normal.         Judgment: Judgment normal.         No flowsheet data found.     Lab Results   Component Value Date    CHOL 230 02/21/2017    TRIG 268 02/21/2017    HDL 43 02/21/2017    [address] 133 02/21/2017    GLUCOSE 91 10/18/2018       The ASCVD Risk score (Kajal Plascencia., et al., 2013) failed to calculate for the following reasons:    Cannot find a previous HDL lab    Cannot find a previous total cholesterol lab    Immunization History   Administered Date(s) Administered    Influenza Virus Vaccine 10/11/2018    Tdap (Boostrix, Adacel) 10/20/2018       Health Maintenance   Topic Date Due    COVID-19 Vaccine (1) Never done    Potassium monitoring  10/18/2019    Creatinine monitoring  10/18/2019    Flu vaccine (Season Ended) 09/01/2021    Lipid screen  02/21/2022    Cervical cancer screen  03/09/2023    DTaP/Tdap/Td vaccine (2 - Td) 10/20/2028    Hepatitis C screen  Completed    HIV screen  Completed    Hepatitis A vaccine  Aged Out    Hepatitis B vaccine  Aged Out    Hib vaccine  Aged Out    Meningococcal (ACWY) vaccine  Aged Out    Pneumococcal 0-64 years Vaccine  Aged Out    Varicella vaccine  Discontinued       ASSESSMENT/PLAN:  1. Bipolar disorder, in partial remission, most recent episode manic ([facility] [address]  -Symptoms have been controlled on Geodon. Continue at current dose. The medication use as well as side effects were reviewed with the patient. They are self-pay. Was encouraged to get the good Rx yvonne. Follow-up in 6 months.  -     ziprasidone (GEODON) 60 MG capsule; Take 1 capsule by mouth daily (with breakfast), Disp-90 capsule, R-1Takes 80 mg in addition to the 60 mgNormal  -     ziprasidone (GEODON) 80 MG capsule; Take 1 capsule by mouth 2 times daily (with meals) 60 mg tablet in the morning. 80 mg tablet with lunch and with dinner., Disp-180 capsule, R-1Patient takes in addition to 60 mg tablet. Normal    2. Alcoholism [facility])  -Patient has been 53 days sober. Congratulated on this achievement. Encouraged to follow-up as needed if any issues are to arise.   It was recommended that the patient get a sponsor which is offered through the services she was using or reach out for therapist.  This will help to cope with anxiety and prevent relapse. -The patient is not interested in starting medication for the alcoholism today. She has been educated to follow-up if she feels that this would be beneficial.  Will need repeat CMP prior to starting this.  -Per patient, liver enzymes were elevated while in rehab. Will repeat at next appointment. If continue be elevated, consider ultrasound of the liver. 3. Encounter to establish care  -The patient's medical, surgical, social, and family history were reviewed with the patient. All medications were reconciled. -Patient declines COVID-19 vaccine today  -patient is not fasting today. Will obtain fasting labs at appointment in 6 months. Return in about 6 months (around 10/14/2021) for Follow-up Mental Health/Fasting Labs. An electronic signature was used to authenticate this note.     --DANELLE Barbour - CNP on 4/14/2021 at 5:14 PM

## 2021-04-14 NOTE — PATIENT INSTRUCTIONS
Patient Education        ziprasidone (oral/injection)  Pronunciation:  zi PRAY si done  Brand:  Geodon  What is the most important information I should know about ziprasidone? You should not use this medicine if you have a heart rhythm disorder, long QT syndrome, uncontrolled heart failure, or if you have recently had a heart attack. Tell your doctor about all your current medicines and any you start or stop using. Many drugs can interact, and some drugs should not be used together. Stop taking ziprasidone and call your doctor right away if you have sudden dizziness, fast or pounding heartbeats, fluttering in your chest, shortness of breath, or a new or worsening skin rash with fever, or swollen glands. Ziprasidone is not approved for use in older adults with dementia-related psychosis. What is ziprasidone? Ziprasidone is an antipsychotic medicine. Ziprasidone oral is used to treat schizophrenia and the manic symptoms of bipolar disorder (manic depression). Ziprasidone injection is used to quickly treat agitation in people with schizophrenia. Ziprasidone may also be used for purposes not listed in this medication guide. What should I discuss with my healthcare provider before taking ziprasidone? You should not use this medicine if you are allergic to ziprasidone, or if you have:  · a heart rhythm disorder;  · long QT syndrome (in you or a family member);  · uncontrolled or untreated heart failure; or  · if you have recently had a heart attack. Ziprasidone can cause a serious heart problem. Your risk may be higher if you also use certain other medicines.  Your doctor may change your treatment plan if you also use:  · an antidepressant;  · bronchodilator asthma medication;  · cancer medicine;  · certain medicines for infections;  · heart or blood pressure medication;  · medicine to treat or prevent malaria;  · medicine to prevent nausea or vomiting;  · medicine to treat a psychiatric disorder; or  · medicine to treat HIV or AIDS. Ziprasidone may increase the risk of death in older adults with dementia-related psychosis and is not approved for this use. Tell your doctor if you have ever had:  · any heart problems;  · a heart attack or stroke;  · fainting spells;  · breast cancer;  · low blood levels of potassium or magnesium;  · diabetes (ziprasidone may raise your blood sugar);  · seizures or epilepsy;  · suicidal thoughts;  · Alzheimer's disease;  · trouble swallowing;  · liver disease; or  · kidney disease. Taking antipsychotic medicine in the last 3 months of pregnancy may cause breathing problems, feeding problems, or withdrawal symptoms in the . If you get pregnant, tell your doctor right away. Do not stop taking ziprasidone without your doctor's advice. You should not breastfeed while using this medicine. Older adults may be more sensitive to the effects of this medicine. Ziprasidone is not approved for use by anyone younger than 25years old. How should I take ziprasidone? Follow all directions on your prescription label and read all medication guides or instruction sheets. Use the medicine exactly as directed. Ziprasidone oral is taken by mouth. Ziprasidone injection is injected into a muscle. A healthcare provider will give you this injection if you are unable to take the medicine by mouth. Take this medicine with food. Swallow the capsule whole. You may need frequent medical tests. If you are diabetic, check your blood sugar levels on a regular basis while you are taking ziprasidone. It may take several weeks before your symptoms improve. Do not stop using ziprasidone suddenly, even if you feel fine. Keep using the medication as directed and tell your doctor if your symptoms do not improve. Store at room temperature away from moisture, light, and heat. What happens if I miss a dose?   Take the medicine as soon as you can, but skip the missed dose if it is almost time for your next dose. Do not take two doses at one time. Get your prescription refilled before you run out of medicine completely. What happens if I overdose? Seek emergency medical attention or call the Poison Help line at 1-380.290.8785. What should I avoid while taking ziprasidone? While you are taking ziprasidone, you may be more sensitive to extreme temperatures, such as very hot or cold conditions. Avoid getting too cold, or becoming overheated or dehydrated. Drink plenty of fluids, especially in hot weather and during exercise. It is easier to become dangerously overheated and dehydrated while you are taking ziprasidone. Avoid driving or hazardous activity until you know how this medicine will affect you. Dizziness or drowsiness can cause falls, accidents, or severe injuries. Avoid getting up too fast from a sitting or lying position, or you may feel dizzy. Avoid drinking alcohol. Dangerous side effects could occur. What are the possible side effects of ziprasidone? Get emergency medical help if you have signs of an allergic reaction (hives, difficult breathing, swelling in your face or throat) or a severe skin reaction (fever, sore throat, burning in your eyes, skin pain, red or purple skin rash that spreads and causes blistering and peeling). Seek medical treatment if you have a serious drug reaction that can affect many parts of your body. Symptoms may include: skin rash, fever, swollen glands, muscle aches, severe weakness, unusual bruising, or yellowing of your skin or eyes.   Stop using ziprasidone and call your doctor at once if you have:  · fast or pounding heartbeats, fluttering in your chest, shortness of breath, and sudden dizziness (like you might pass out);  · uncontrolled muscle movements in your face (chewing, lip smacking, frowning, tongue movement, blinking or eye movement);  · any skin rash, no matter how mild;  · low white blood cell counts --fever, mouth sores, skin sores, sore throat, cough, trouble breathing;  · high blood sugar --increased thirst, increased urination, dry mouth, fruity breath odor; or  · severe nervous system reaction --very stiff (rigid) muscles, high fever, sweating, confusion, agitation. Common side effects may include:  · dizziness, drowsiness, weakness;  · headache;  · nausea, vomiting;  · trouble swallowing;  · weight gain;  · feeling restless or being unable to sit still;  · tremors, involuntary muscle movements;  · vision problems; or  · runny nose, new or worsening cough. This is not a complete list of side effects and others may occur. Call your doctor for medical advice about side effects. You may report side effects to FDA at 9-768-MZT-5826. What other drugs will affect ziprasidone? Ziprasidone can cause a serious heart problem. Your risk may be higher if you also use certain other medicines for infections, asthma, heart problems, high blood pressure, depression, mental illness, cancer, malaria, or HIV. Using ziprasidone with other drugs that make you drowsy can worsen this effect. Ask your doctor before using opioid medication, a sleeping pill, a muscle relaxer, or medicine for anxiety or seizures. Many drugs can affect ziprasidone, and some drugs should not be used at the same time. Tell your doctor about all your current medicines and any medicine you start or stop using. This includes prescription and over-the-counter medicines, vitamins, and herbal products. Not all possible interactions are listed here. Where can I get more information? Your pharmacist can provide more information about ziprasidone. Remember, keep this and all other medicines out of the reach of children, never share your medicines with others, and use this medication only for the indication prescribed. Every effort has been made to ensure that the information provided by [address] is accurate, up-to-date, and complete, but no guarantee is made to that effect. Drug information contained herein may be time sensitive. [employer] information has been compiled for use by healthcare practitioners and consumers in the [place] and therefore [employer] does not warrant that uses outside of the [place] are appropriate, unless specifically indicated otherwise. [facility] drug information does not endorse drugs, diagnose patients or recommend therapy. [facility] drug information is an informational resource designed to assist licensed healthcare practitioners in caring for their patients and/or to serve consumers viewing this service as a supplement to, and not a substitute for, the expertise, skill, knowledge and judgment of healthcare practitioners. The absence of a warning for a given drug or drug combination in no way should be construed to indicate that the drug or drug combination is safe, effective or appropriate for any given patient. [facility] does not assume any responsibility for any aspect of healthcare administered with the aid of information [facility] provides. The information contained herein is not intended to cover all possible uses, directions, precautions, warnings, drug interactions, allergic reactions, or adverse effects. If you have questions about the drugs you are taking, check with your doctor, nurse or pharmacist.  Copyright 5240-3401 [address]. Version: 13.03. Revision date: 3/16/2020. Care instructions adapted under license by [facility]. If you have questions about a medical condition or this instruction, always ask your healthcare professional. [place] [address] any warranty or liability for your use of this information.

## 2021-08-12 ENCOUNTER — OFFICE VISIT (OUTPATIENT)
Dept: FAMILY MEDICINE CLINIC | Age: 41
End: 2021-08-12

## 2021-08-12 VITALS
SYSTOLIC BLOOD PRESSURE: 118 MMHG | BODY MASS INDEX: 30.98 KG/M2 | WEIGHT: 157.8 LBS | DIASTOLIC BLOOD PRESSURE: 70 MMHG | TEMPERATURE: 98.1 F | HEART RATE: 78 BPM | OXYGEN SATURATION: 99 % | HEIGHT: 60 IN

## 2021-08-12 DIAGNOSIS — F31.73 BIPOLAR DISORDER, IN PARTIAL REMISSION, MOST RECENT EPISODE MANIC (HCC): Primary | ICD-10-CM

## 2021-08-12 PROCEDURE — 99213 OFFICE O/P EST LOW 20 MIN: CPT | Performed by: NURSE PRACTITIONER

## 2021-08-12 RX ORDER — FLUOXETINE HYDROCHLORIDE 20 MG/1
20 CAPSULE ORAL DAILY
Qty: 30 CAPSULE | Refills: 3 | Status: SHIPPED | OUTPATIENT
Start: 2021-08-12 | End: 2021-10-14 | Stop reason: SDUPTHER

## 2021-08-12 RX ORDER — ZIPRASIDONE HYDROCHLORIDE 20 MG/1
20-40 CAPSULE ORAL 2 TIMES DAILY WITH MEALS
Qty: 120 CAPSULE | Refills: 0 | Status: SHIPPED | OUTPATIENT
Start: 2021-08-12 | End: 2021-10-14

## 2021-08-12 ASSESSMENT — ENCOUNTER SYMPTOMS
WHEEZING: 0
SHORTNESS OF BREATH: 0

## 2021-08-12 NOTE — LETTER
[address] [address]  Phone: [phone]  Fax: [phone]    DANELLE Negrete CNP        August 12, 2021     Patient: Isaak Stinson   YOB: 1980   Date of Visit: 8/12/2021       To Whom It May Concern: It is my medical opinion that Isaak Stinson may return to work on 8/13/21. If you have any questions or concerns, please don't hesitate to call.     Sincerely,        DANELLE Negrete CNP

## 2021-08-12 NOTE — PATIENT INSTRUCTIONS
Take 60 mg geodon twice a day for 1-2 weeks, then decrease to 40 mg (x2 20 mg) twice a day for 1-2 weeks, then decrease to 20 mg (x1 20 mg) twice a day for 1-2 weeks. If necessary, can decrease to 20 mg daily for 1-2 weeks prior to stopping. When stopping the geodon, start the fluoxetine.

## 2021-08-12 NOTE — PROGRESS NOTES
Marielena Guillaume (:  1980) is a 36 y.o. female,Established patient, here for evaluation of the following chief complaint(s):  Depression (ROUTINE DEPRESSION FOLLOW UP TO DISCUSS MEDS- CAN NOT TAKE HE GEODON ANYMORE - SHE IS HAVING A LOT OF S/E. )      ASSESSMENT/PLAN:  1. Bipolar disorder, in partial remission, most recent episode manic ([address]  -We will wean down Geodon and start fluoxetine. The patient has not tried fluoxetine or Abilify. Has tried other numerous agents. She does not have insurance, so Jorge Fong is not an option for her.  -Since the patient only took 60 mg once a day yesterday and today so far, attempt to wean down to 60 mg daily. If the patient has difficulty with this, take 60 mg twice a day for 1-2 weeks. Decrease to 40 mg once or twice a day for 1 to 2 weeks, then decrease to 20 mg once or twice daily for 1 to 2 weeks. The medication can then be discontinued. Start Prozac at the same time as discontinuing Geodon. A refill of 20 mg Geodon has been sent to cover for this weaning down process. Can follow-up at scheduled physical with fasting labs on 10/14 or sooner if needed. -     ziprasidone (GEODON) 20 MG capsule; Take 1-2 capsules by mouth 2 times daily (with meals), Disp-120 capsule, R-0Normal  -     FLUoxetine (PROZAC) 20 MG capsule; Take 1 capsule by mouth daily, Disp-30 capsule, R-3Normal      Return in about 9 weeks (around 10/14/2021) for Annual Physical/Fasting Labs. SUBJECTIVE/OBJECTIVE:  AISHA Villalobos presents today with concerns regarding her Geodon. She states that she has been experiencing a lot of side effects. She has had increased anxiety. She has had a significant loss of appetite. Has lost almost 20 pounds. She states she is tired all day. She is wondering if she could stop this medication be started on something else.   She definitely feels as though she needs medication therapy for her bipolar, but she does not feel that the Geodon is a good choice for her.  She has tried numerous agents in the past.  States she has not tried fluoxetine or Abilify to her knowledge. She states she stopped taking the 80 mg Geodon yesterday. She noted that she was already having symptoms of withdrawal prior to taking her 60 mg dose this morning. The 60 mg dose did help with the symptoms. She has stayed sober. Is now 6 months. Review of Systems   Constitutional: Positive for unexpected weight change. Negative for chills and fever. Respiratory: Negative for shortness of breath and wheezing. Cardiovascular: Negative for chest pain, palpitations and leg swelling. Neurological: Negative for dizziness, weakness, light-headedness, numbness and headaches. Psychiatric/Behavioral: Negative for decreased concentration, dysphoric mood and sleep disturbance. The patient is nervous/anxious. Physical Exam  Constitutional:       General: She is not in acute distress. Appearance: Normal appearance. She is normal weight. Cardiovascular:      Rate and Rhythm: Normal rate and regular rhythm. Pulses: Normal pulses. Heart sounds: Normal heart sounds. No murmur heard. No gallop. Pulmonary:      Effort: Pulmonary effort is normal.      Breath sounds: Normal breath sounds. No wheezing. Neurological:      Mental Status: She is alert and oriented to person, place, and time. Psychiatric:         Mood and Affect: Mood normal.         Behavior: Behavior normal.         Thought Content: Thought content normal.         Judgment: Judgment normal.               This dictation was generated by voice recognition computer software. Although all attempts are made to edit the dictation for accuracy, there may be errors in the transcription that are not intended. An electronic signature was used to authenticate this note.     --Leopold Das, APRN - CNP

## 2021-09-07 ENCOUNTER — TELEPHONE (OUTPATIENT)
Dept: FAMILY MEDICINE CLINIC | Age: 41
End: 2021-09-07

## 2021-09-07 NOTE — TELEPHONE ENCOUNTER
If this is believed to be from coming off geodon, I suggest going back up to the dose she just came down from and stay at that dose for an additional week before tapering down further. If symptoms are severe, she can go to ER as most people don't have much issue tapering off geodon.

## 2021-09-07 NOTE — TELEPHONE ENCOUNTER
----- Message from Rhonda Marcus sent at 9/7/2021  3:13 PM EDT -----  Subject: Message to Provider    QUESTIONS  Information for Provider? Pt is being taken off of ziprasidone (GEODON) 20   MG capsule. She is has been having shakes and sweats for two days. Please   advise pt on what to do.  ---------------------------------------------------------------------------  --------------  CALL BACK INFO  What is the best way for the office to contact you? OK to leave message on   voicemail  Preferred Call Back Phone Number? [phone]  ---------------------------------------------------------------------------  --------------  SCRIPT ANSWERS  Relationship to Patient?  Self

## 2021-10-14 ENCOUNTER — OFFICE VISIT (OUTPATIENT)
Dept: FAMILY MEDICINE CLINIC | Age: 41
End: 2021-10-14

## 2021-10-14 VITALS
WEIGHT: 156.4 LBS | SYSTOLIC BLOOD PRESSURE: 118 MMHG | TEMPERATURE: 98.2 F | BODY MASS INDEX: 30.7 KG/M2 | DIASTOLIC BLOOD PRESSURE: 78 MMHG | HEART RATE: 78 BPM | OXYGEN SATURATION: 97 % | HEIGHT: 60 IN

## 2021-10-14 DIAGNOSIS — Z13.220 SCREENING, LIPID: ICD-10-CM

## 2021-10-14 DIAGNOSIS — F31.74 BIPOLAR DISORDER, IN FULL REMISSION, MOST RECENT EPISODE MANIC (HCC): Primary | ICD-10-CM

## 2021-10-14 DIAGNOSIS — Z86.2 HISTORY OF ANEMIA: ICD-10-CM

## 2021-10-14 DIAGNOSIS — Z13.1 SCREENING FOR DIABETES MELLITUS: ICD-10-CM

## 2021-10-14 LAB
A/G RATIO: 2 (ref 1.1–2.2)
ALBUMIN SERPL-MCNC: 4.5 G/DL (ref 3.4–5)
ALP BLD-CCNC: 74 U/L (ref 40–129)
ALT SERPL-CCNC: 10 U/L (ref 10–40)
ANION GAP SERPL CALCULATED.3IONS-SCNC: 11 MMOL/L (ref 3–16)
AST SERPL-CCNC: 11 U/L (ref 15–37)
BASOPHILS ABSOLUTE: 0 K/UL (ref 0–0.2)
BASOPHILS RELATIVE PERCENT: 0.5 %
BILIRUB SERPL-MCNC: 0.3 MG/DL (ref 0–1)
BUN BLDV-MCNC: 5 MG/DL (ref 7–20)
CALCIUM SERPL-MCNC: 9.6 MG/DL (ref 8.3–10.6)
CHLORIDE BLD-SCNC: 102 MMOL/L (ref 99–110)
CHOLESTEROL, TOTAL: 201 MG/DL (ref 0–199)
CO2: 25 MMOL/L (ref 21–32)
CREAT SERPL-MCNC: 0.8 MG/DL (ref 0.6–1.1)
EOSINOPHILS ABSOLUTE: 0.1 K/UL (ref 0–0.6)
EOSINOPHILS RELATIVE PERCENT: 2 %
GFR AFRICAN AMERICAN: >60
GFR NON-AFRICAN AMERICAN: >60
GLOBULIN: 2.2 G/DL
GLUCOSE BLD-MCNC: 86 MG/DL (ref 70–99)
HCT VFR BLD CALC: 38 % (ref 36–48)
HDLC SERPL-MCNC: 47 MG/DL (ref 40–60)
HEMOGLOBIN: 12.6 G/DL (ref 12–16)
LDL CHOLESTEROL CALCULATED: 137 MG/DL
LYMPHOCYTES ABSOLUTE: 1.6 K/UL (ref 1–5.1)
LYMPHOCYTES RELATIVE PERCENT: 32.2 %
MCH RBC QN AUTO: 30.6 PG (ref 26–34)
MCHC RBC AUTO-ENTMCNC: 33.2 G/DL (ref 31–36)
MCV RBC AUTO: 91.9 FL (ref 80–100)
MONOCYTES ABSOLUTE: 0.5 K/UL (ref 0–1.3)
MONOCYTES RELATIVE PERCENT: 10.3 %
NEUTROPHILS ABSOLUTE: 2.8 K/UL (ref 1.7–7.7)
NEUTROPHILS RELATIVE PERCENT: 55 %
PDW BLD-RTO: 12.8 % (ref 12.4–15.4)
PLATELET # BLD: 206 K/UL (ref 135–450)
PMV BLD AUTO: 10.5 FL (ref 5–10.5)
POTASSIUM SERPL-SCNC: 4.6 MMOL/L (ref 3.5–5.1)
RBC # BLD: 4.13 M/UL (ref 4–5.2)
SODIUM BLD-SCNC: 138 MMOL/L (ref 136–145)
TOTAL PROTEIN: 6.7 G/DL (ref 6.4–8.2)
TRIGL SERPL-MCNC: 85 MG/DL (ref 0–150)
VLDLC SERPL CALC-MCNC: 17 MG/DL
WBC # BLD: 5.1 K/UL (ref 4–11)

## 2021-10-14 PROCEDURE — 36415 COLL VENOUS BLD VENIPUNCTURE: CPT | Performed by: NURSE PRACTITIONER

## 2021-10-14 PROCEDURE — 99213 OFFICE O/P EST LOW 20 MIN: CPT | Performed by: NURSE PRACTITIONER

## 2021-10-14 RX ORDER — FLUOXETINE HYDROCHLORIDE 20 MG/1
20 CAPSULE ORAL DAILY
Qty: 90 CAPSULE | Refills: 3 | Status: SHIPPED | OUTPATIENT
Start: 2021-10-14 | End: 2022-09-08

## 2021-10-14 ASSESSMENT — ENCOUNTER SYMPTOMS
SHORTNESS OF BREATH: 0
WHEEZING: 0

## 2021-10-14 NOTE — PROGRESS NOTES
Inna Franco (:  1980) is a 36 y.o. female,Established patient, here for evaluation of the following chief complaint(s):  Mood Swings (ROUTINE FOLLOW UP FOR MOOD- Evens Corbin, HAS BEEN OFF ABOUT 2 WEEKS AND DOES NOT WANT TO CONTINUE DUE TO S/E. HAD LOSS OF APPETITE, SUICIDAL THOUGHTS, VERY TIRED. STATES THE THOUGHTS ARE GONE NOW AND SHE IS OKAY. )      ASSESSMENT/PLAN:  1. Bipolar disorder, in full remission, most recent episode manic ([address]  -Well-controlled on fluoxetine. Continue as ordered. Refill sent. Follow-up in 1 year for physical, or sooner if needed. -Emphasized the patient that it can take up to a month to truly see response to medications. If she feels that the dose needs to be increased after a month of being off the Geodon, she can schedule an appointment to discuss.  -     FLUoxetine (PROZAC) 20 MG capsule; Take 1 capsule by mouth daily, Disp-90 capsule, R-3Normal  2. History of anemia  -Anemic in the past.  Recheck CBC with labs  -     CBC Auto Differential; Future  3. Screening, lipid  -Fasting today. Has not had labs since 2018. -     Lipid Panel; Future  4. Screening for diabetes mellitus  -Fasting today. Has not had labs since 2018. -     Comprehensive Metabolic Panel; Future      Return in about 1 year (around 10/14/2022) for Annual Physical.    SUBJECTIVE/OBJECTIVE:  AISHA Jackson presents today for mood disorder follow-up. At her previous appointment, Britney Bloodgood was to be discontinued and Prozac started. The patient states that while she was working to wean down the Britney Bloodgood she started having suicidal ideation, increased anxiety, and loss of appetite. She states that she stopped the medication immediately when this occurred and she has not been having issues since. She states that the Prozac is working very well for her at 20 mg. Feels that her mood is much better since starting the new medicine. She would like to continue it as ordered.   Denies any suicidal ideation at this time. She is fasting for labs today. Has not had them since 2018. Review of Systems   Constitutional: Negative for chills and fever. Respiratory: Negative for shortness of breath and wheezing. Cardiovascular: Negative for chest pain, palpitations and leg swelling. Neurological: Negative for dizziness, weakness, light-headedness, numbness and headaches. Psychiatric/Behavioral: Negative for decreased concentration, dysphoric mood, self-injury, sleep disturbance and suicidal ideas. The patient is not nervous/anxious. Physical Exam  Constitutional:       General: She is not in acute distress. Appearance: Normal appearance. She is obese. Cardiovascular:      Rate and Rhythm: Normal rate and regular rhythm. Pulses: Normal pulses. Heart sounds: Normal heart sounds. No murmur heard. No gallop. Pulmonary:      Effort: Pulmonary effort is normal.      Breath sounds: Normal breath sounds. No wheezing. Neurological:      Mental Status: She is alert and oriented to person, place, and time. Psychiatric:         Mood and Affect: Mood normal.         Behavior: Behavior normal.         Thought Content: Thought content normal.         Judgment: Judgment normal.               This dictation was generated by voice recognition computer software. Although all attempts are made to edit the dictation for accuracy, there may be errors in the transcription that are not intended. An electronic signature was used to authenticate this note.     --DANELLE Deshpande - CNP

## 2022-01-13 ENCOUNTER — TELEPHONE (OUTPATIENT)
Dept: FAMILY MEDICINE CLINIC | Age: 42
End: 2022-01-13

## 2022-01-13 DIAGNOSIS — Z20.822 SUSPECTED COVID-19 VIRUS INFECTION: Primary | ICD-10-CM

## 2022-01-13 NOTE — TELEPHONE ENCOUNTER
PT @  [phone]    SHE TOOK A COVID TEST AT HOME TODAY AND IT WAS POSITIVE - HER WORK NEEDS A NOTE FROM THE DOCTOR - WHAT CAN SHE DO BRING IT UP HERE?

## 2022-01-14 NOTE — TELEPHONE ENCOUNTER
We typically don't recommend retesting for COVID-19 after infection.  If her employer is requiring a test prior to return to work, can order under me to be done at [place].

## 2022-01-14 NOTE — TELEPHONE ENCOUNTER
Pt is calling to say that her employer wants  A copy of the test with her name on it and the result.     She wants to know if you can  Order her a test?       Please call

## 2022-01-17 DIAGNOSIS — Z20.822 SUSPECTED COVID-19 VIRUS INFECTION: ICD-10-CM

## 2022-01-18 LAB — SARS-COV-2: DETECTED

## 2022-01-19 ENCOUNTER — TELEPHONE (OUTPATIENT)
Dept: FAMILY MEDICINE CLINIC | Age: 42
End: 2022-01-19

## 2022-06-02 ENCOUNTER — TELEPHONE (OUTPATIENT)
Dept: FAMILY MEDICINE CLINIC | Age: 42
End: 2022-06-02

## 2022-06-02 NOTE — TELEPHONE ENCOUNTER
Pt took an At home Covid test and it is positive. She took this yesterday. She is wanting to know if we can write her a note to be off work for 5 days?     Alex Gardner

## 2022-06-02 NOTE — TELEPHONE ENCOUNTER
PER MARIVEL CADENA FOR NOTE. CALLED PATIENT AND ADVISED NOTE DONE. WILL CALL WHEN ARRIVED TO HAVE SOMEONE RUN NOTE OUT TO CAR. MAURISIO

## 2022-06-02 NOTE — LETTER
[address] [address]  Phone: [phone]  Fax: [phone]    DANELLE Trejo CNP        June 2, 2022     Patient: Sharon Perez   YOB: 1980   Date of Visit: 6/2/2022       To Whom it May Concern:      Sharon Perez has tested POSITIVE for Covid-19 on 06/01/2022. She may return to work on 06/06/2022. If you have any questions or concerns, please don't hesitate to call.       Sincerely,           DANELLE Trejo CNP

## 2022-09-07 DIAGNOSIS — F31.74 BIPOLAR DISORDER, IN FULL REMISSION, MOST RECENT EPISODE MANIC (HCC): ICD-10-CM

## 2022-09-08 RX ORDER — FLUOXETINE HYDROCHLORIDE 20 MG/1
CAPSULE ORAL
Qty: 90 CAPSULE | Refills: 0 | Status: SHIPPED | OUTPATIENT
Start: 2022-09-08 | End: 2022-09-15 | Stop reason: SDUPTHER

## 2022-09-10 DIAGNOSIS — F31.74 BIPOLAR DISORDER, IN FULL REMISSION, MOST RECENT EPISODE MANIC (HCC): ICD-10-CM

## 2022-09-12 RX ORDER — FLUOXETINE HYDROCHLORIDE 20 MG/1
CAPSULE ORAL
Qty: 90 CAPSULE | Refills: 0 | OUTPATIENT
Start: 2022-09-12

## 2022-09-12 NOTE — TELEPHONE ENCOUNTER
FLUoxetine (PROZAC) 20 MG capsule 90 capsule 0 9/8/2022     Sig: Take 1 capsule by mouth once daily    Sent to pharmacy as: FLUoxetine HCl 20 MG Oral Capsule (PROZAC)    Cosign for Ordering: Accepted by DANELLE David CNP on 9/8/2022  9:37 AM    E-Prescribing Status: Receipt confirmed by pharmacy (9/8/2022  5:07 AM EDT)    Leslie Coley REQUEST

## 2022-09-15 ENCOUNTER — OFFICE VISIT (OUTPATIENT)
Dept: FAMILY MEDICINE CLINIC | Age: 42
End: 2022-09-15

## 2022-09-15 VITALS
HEIGHT: 60 IN | OXYGEN SATURATION: 98 % | SYSTOLIC BLOOD PRESSURE: 112 MMHG | DIASTOLIC BLOOD PRESSURE: 80 MMHG | HEART RATE: 80 BPM | WEIGHT: 177 LBS | BODY MASS INDEX: 34.75 KG/M2

## 2022-09-15 DIAGNOSIS — F10.20 ALCOHOLISM (HCC): ICD-10-CM

## 2022-09-15 DIAGNOSIS — Z13.220 SCREENING, LIPID: ICD-10-CM

## 2022-09-15 DIAGNOSIS — F31.74 BIPOLAR DISORDER, IN FULL REMISSION, MOST RECENT EPISODE MANIC (HCC): Primary | ICD-10-CM

## 2022-09-15 PROCEDURE — 99214 OFFICE O/P EST MOD 30 MIN: CPT | Performed by: NURSE PRACTITIONER

## 2022-09-15 RX ORDER — FLUOXETINE HYDROCHLORIDE 20 MG/1
40 CAPSULE ORAL DAILY
Qty: 180 CAPSULE | Refills: 3 | Status: SHIPPED | OUTPATIENT
Start: 2022-09-15

## 2022-09-15 ASSESSMENT — PATIENT HEALTH QUESTIONNAIRE - PHQ9
SUM OF ALL RESPONSES TO PHQ QUESTIONS 1-9: 7
6. FEELING BAD ABOUT YOURSELF - OR THAT YOU ARE A FAILURE OR HAVE LET YOURSELF OR YOUR FAMILY DOWN: 1
SUM OF ALL RESPONSES TO PHQ9 QUESTIONS 1 & 2: 1
SUM OF ALL RESPONSES TO PHQ QUESTIONS 1-9: 7
SUM OF ALL RESPONSES TO PHQ QUESTIONS 1-9: 7
7. TROUBLE CONCENTRATING ON THINGS, SUCH AS READING THE NEWSPAPER OR WATCHING TELEVISION: 3
SUM OF ALL RESPONSES TO PHQ QUESTIONS 1-9: 7
10. IF YOU CHECKED OFF ANY PROBLEMS, HOW DIFFICULT HAVE THESE PROBLEMS MADE IT FOR YOU TO DO YOUR WORK, TAKE CARE OF THINGS AT HOME, OR GET ALONG WITH OTHER PEOPLE: 0
1. LITTLE INTEREST OR PLEASURE IN DOING THINGS: 0
9. THOUGHTS THAT YOU WOULD BE BETTER OFF DEAD, OR OF HURTING YOURSELF: 0
8. MOVING OR SPEAKING SO SLOWLY THAT OTHER PEOPLE COULD HAVE NOTICED. OR THE OPPOSITE, BEING SO FIGETY OR RESTLESS THAT YOU HAVE BEEN MOVING AROUND A LOT MORE THAN USUAL: 0
5. POOR APPETITE OR OVEREATING: 0
4. FEELING TIRED OR HAVING LITTLE ENERGY: 1
2. FEELING DOWN, DEPRESSED OR HOPELESS: 1
3. TROUBLE FALLING OR STAYING ASLEEP: 1

## 2022-09-15 ASSESSMENT — ENCOUNTER SYMPTOMS
EYE PAIN: 0
SHORTNESS OF BREATH: 0
DIARRHEA: 0
NAUSEA: 0
SINUS PRESSURE: 0
EYE REDNESS: 0
SORE THROAT: 0
WHEEZING: 0
EYE DISCHARGE: 0
COUGH: 0
SINUS PAIN: 0
ABDOMINAL PAIN: 0
VOMITING: 0
ABDOMINAL DISTENTION: 0

## 2022-09-15 NOTE — PROGRESS NOTES
Justin Puentes (:  1980) is a 39 y.o. female,Established patient, here for evaluation of the following chief complaint(s):  Medication Check (MEDICATION FOLLOW UP )      ASSESSMENT/PLAN:  1. Bipolar disorder, in full remission, most recent episode manic ([facility] [address]  -Uncontrolled at this time. Discussed options. Increase fluoxetine to 40 mg daily. Educated that we could increase over the winter season to help with seasonal depression and possibly decrease back down to 20 mg once the winter is over. We will schedule labs only appointment and then can follow-up annually, or sooner if needed if dose needs to be adjusted further.  -     FLUoxetine (PROZAC) 20 MG capsule; Take 2 capsules by mouth daily, Disp-180 capsule, R-3Normal  2. Alcoholism ([facility] [address]  -2 years sober. Congratulated on the achievement. 3. Screening, lipid  -Due for fasting labs. Will obtain at a labs only appointment. Can then follow-up annually. -     Comprehensive Metabolic Panel; Future  -     Lipid Panel; Future    Return in about 1 year (around 9/15/2023) for Labs only soon, then Annual Physical.    SUBJECTIVE/OBJECTIVE:  AISHA  Elena Suarez presents today for medication follow-up. She does have concerns with her medication today. She states that for the past month to month and a half she has been having dysphoric mood. She has no particular reason for this to occur and thinks it may be seasonal.  Otherwise, she states that the fluoxetine was working well for her at 20 mg. Wondering if any adjustments need to be made given the recent funk. She states she continues to be sober. Going on 2 years. Work is been going very well. Staying very busy. Working with the [employer] building [employer]. She denies any suicidal ideation or thoughts of self-harm. She is agreeable to coming back for a labs only appointment. She goes to [address] [place] [address] in [facility] for her Pap smears.     Review of Systems   Constitutional:  Negative for chills and fever. HENT:  Negative for ear discharge, ear pain, hearing loss, sinus pressure, sinus pain and sore throat. Eyes:  Negative for pain, discharge and redness. Respiratory:  Negative for cough, shortness of breath and wheezing. Cardiovascular:  Negative for chest pain and palpitations. Gastrointestinal:  Negative for abdominal distention, abdominal pain, diarrhea, nausea and vomiting. Genitourinary:  Negative for dysuria and hematuria. Musculoskeletal:  Negative for myalgias. Skin:  Negative for rash. Neurological:  Negative for dizziness, weakness, light-headedness, numbness and headaches. Psychiatric/Behavioral:  Positive for dysphoric mood. Negative for decreased concentration, self-injury, sleep disturbance and suicidal ideas. The patient is not nervous/anxious. Physical Exam  Constitutional:       General: She is not in acute distress. Appearance: Normal appearance. She is obese. HENT:      Head: Normocephalic and atraumatic. Right Ear: Tympanic membrane, ear canal and external ear normal.      Left Ear: Tympanic membrane, ear canal and external ear normal.      Mouth/Throat:      Mouth: Mucous membranes are moist.   Eyes:      Extraocular Movements: Extraocular movements intact. Conjunctiva/sclera: Conjunctivae normal.      Pupils: Pupils are equal, round, and reactive to light. Neck:      Thyroid: No thyromegaly. Vascular: No carotid bruit. Cardiovascular:      Rate and Rhythm: Normal rate and regular rhythm. Pulses: Normal pulses. Heart sounds: Normal heart sounds. No murmur heard. No gallop. Pulmonary:      Effort: Pulmonary effort is normal.      Breath sounds: Normal breath sounds. No wheezing. Abdominal:      General: Bowel sounds are normal.      Palpations: Abdomen is soft. Tenderness: There is no abdominal tenderness. There is no guarding or rebound. Musculoskeletal:         General: Normal range of motion. Cervical back: Normal range of motion and neck supple. Lymphadenopathy:      Cervical: No cervical adenopathy. Skin:     General: Skin is warm and dry. Capillary Refill: Capillary refill takes less than 2 seconds. Neurological:      Mental Status: She is alert and oriented to person, place, and time. Psychiatric:         Mood and Affect: Mood normal.         Behavior: Behavior normal.         Thought Content: Thought content normal.         Judgment: Judgment normal.             This dictation was generated by voice recognition computer software. Although all attempts are made to edit the dictation for accuracy, there may be errors in the transcription that are not intended. An electronic signature was used to authenticate this note.     --Babak Berger, APRN - CNP

## 2022-09-15 NOTE — LETTER
[address] [address] [address] [address]  Phone: [phone]  Fax: [phone]    DANELLE Stanley CNP        September 15, 2022     Patient: Ward Quiroz   YOB: 1980   Date of Visit: 9/15/2022       To Whom It May Concern: It is my medical opinion that Ward Quiroz may return to work on 9/16. If you have any questions or concerns, please don't hesitate to call.     Sincerely,        DANELLE Stanley CNP

## 2022-10-14 ENCOUNTER — NURSE ONLY (OUTPATIENT)
Dept: FAMILY MEDICINE CLINIC | Age: 42
End: 2022-10-14

## 2022-10-14 DIAGNOSIS — Z13.220 SCREENING, LIPID: ICD-10-CM

## 2022-10-14 LAB
A/G RATIO: 2.1 (ref 1.1–2.2)
ALBUMIN SERPL-MCNC: 4.5 G/DL (ref 3.4–5)
ALP BLD-CCNC: 65 U/L (ref 40–129)
ALT SERPL-CCNC: 8 U/L (ref 10–40)
ANION GAP SERPL CALCULATED.3IONS-SCNC: 12 MMOL/L (ref 3–16)
AST SERPL-CCNC: 12 U/L (ref 15–37)
BILIRUB SERPL-MCNC: 0.3 MG/DL (ref 0–1)
BUN BLDV-MCNC: 8 MG/DL (ref 7–20)
CALCIUM SERPL-MCNC: 9.4 MG/DL (ref 8.3–10.6)
CHLORIDE BLD-SCNC: 103 MMOL/L (ref 99–110)
CHOLESTEROL, TOTAL: 249 MG/DL (ref 0–199)
CO2: 23 MMOL/L (ref 21–32)
CREAT SERPL-MCNC: 0.8 MG/DL (ref 0.6–1.1)
GFR AFRICAN AMERICAN: >60
GFR NON-AFRICAN AMERICAN: >60
GLUCOSE BLD-MCNC: 106 MG/DL (ref 70–99)
HDLC SERPL-MCNC: 47 MG/DL (ref 40–60)
LDL CHOLESTEROL CALCULATED: 173 MG/DL
POTASSIUM SERPL-SCNC: 4.4 MMOL/L (ref 3.5–5.1)
SODIUM BLD-SCNC: 138 MMOL/L (ref 136–145)
TOTAL PROTEIN: 6.6 G/DL (ref 6.4–8.2)
TRIGL SERPL-MCNC: 143 MG/DL (ref 0–150)
VLDLC SERPL CALC-MCNC: 29 MG/DL

## 2022-10-14 PROCEDURE — 36415 COLL VENOUS BLD VENIPUNCTURE: CPT | Performed by: NURSE PRACTITIONER

## 2022-10-14 PROCEDURE — 99999 PR OFFICE/OUTPT VISIT,PROCEDURE ONLY: CPT | Performed by: NURSE PRACTITIONER

## 2023-09-13 DIAGNOSIS — F31.74 BIPOLAR DISORDER, IN FULL REMISSION, MOST RECENT EPISODE MANIC (HCC): ICD-10-CM

## 2023-09-14 RX ORDER — FLUOXETINE HYDROCHLORIDE 20 MG/1
40 CAPSULE ORAL DAILY
Qty: 180 CAPSULE | Refills: 0 | OUTPATIENT
Start: 2023-09-14

## 2023-09-14 NOTE — TELEPHONE ENCOUNTER
LV 9/15/22 WITH TR NV NONE  Return in about 1 year (around 9/15/2023) for Labs only soon, then Annual Physical.  PLEASE CALL PT TO SCHEDULE APPT

## 2023-09-15 ENCOUNTER — TELEPHONE (OUTPATIENT)
Dept: FAMILY MEDICINE CLINIC | Age: 43
End: 2023-09-15

## 2023-09-15 DIAGNOSIS — F31.74 BIPOLAR DISORDER, IN FULL REMISSION, MOST RECENT EPISODE MANIC (HCC): ICD-10-CM

## 2023-09-15 RX ORDER — FLUOXETINE HYDROCHLORIDE 20 MG/1
40 CAPSULE ORAL DAILY
Qty: 60 CAPSULE | Refills: 0 | Status: SHIPPED | OUTPATIENT
Start: 2023-09-15

## 2023-09-15 NOTE — TELEPHONE ENCOUNTER
DAMION SENT IN MEDICATION. TRIED TO CALL JOSE JENNINGS TO CALL OFFICE BACK. TISHA IS NOT ON HIPAA. I CANNOT DISCLOSE ANY INFORMATION TO TISHA.  MAURISIO

## 2023-09-15 NOTE — TELEPHONE ENCOUNTER
----- Message from Florence Llamas sent at 9/15/2023  8:13 AM EDT -----  Subject: Appointment Request    Reason for Call: Established Patient Appointment needed: Routine Existing   Condition Follow Up    QUESTIONS    Reason for appointment request? Available appointments did not meet   patient need     Additional Information for Provider? Pts mother called to request appt for   medication refill. Stated pt has 2 pills remaining. Next available appt is   in October. Requested return call to discuss coming in on Monday. Pt of   Lisa Pak  Can be reached at [phone]  ---------------------------------------------------------------------------  --------------  Real [place]  [phone]; OK to leave message on voicemail  ---------------------------------------------------------------------------  --------------  SCRIPT ANSWERS

## 2023-09-15 NOTE — TELEPHONE ENCOUNTER
----- Message from Ferminindianapaddy Kimbrough sent at 9/15/2023  9:00 AM EDT -----  Subject: Refill Request    QUESTIONS  Name of Medication? FLUoxetine (PROZAC) 20 MG capsule  Patient-reported dosage and instructions? Take 2 capsules by mouth daily  How many days do you have left? 2  Preferred Pharmacy? [facility]  Pharmacy phone number (if available)? [phone]  Additional Information for Provider? Patient has an appointment on   10/20/23.  ---------------------------------------------------------------------------  --------------  CALL BACK INFO  What is the best way for the office to contact you? OK to leave message on   voicemail  Preferred Call Back Phone Number? [phone]  ---------------------------------------------------------------------------  --------------  SCRIPT ANSWERS  Relationship to Patient?  Self

## 2023-10-05 DIAGNOSIS — F31.74 BIPOLAR DISORDER, IN FULL REMISSION, MOST RECENT EPISODE MANIC (HCC): ICD-10-CM

## 2023-10-05 RX ORDER — FLUOXETINE HYDROCHLORIDE 20 MG/1
40 CAPSULE ORAL DAILY
Qty: 60 CAPSULE | Refills: 0 | Status: SHIPPED | OUTPATIENT
Start: 2023-10-05

## 2023-10-18 ENCOUNTER — TELEPHONE (OUTPATIENT)
Dept: FAMILY MEDICINE CLINIC | Age: 43
End: 2023-10-18

## 2023-10-18 NOTE — TELEPHONE ENCOUNTER
THERE IS A REFILL AT THE PHARMACY THAT WAS SENT ON 10/5 CALLED HER AND ADVISED AND LET HER KNOW IF SHE HAS TROUBLE FILLING LET US KNOW. Cherylene Phy

## 2023-10-18 NOTE — TELEPHONE ENCOUNTER
Pt 's appt is Friday Oct 20 with Timbo Ruiz. Pt ran out of her medication yesterday. Pt wants to know If she can get a few days worth until her appt. She said she can't go to work like this with out her meds. Is it her Fluoxetine 20 mg she takes 2 a day.     Please send to [facility]    Please call pt when we call this in Detail Level: Simple

## 2023-10-20 ENCOUNTER — OFFICE VISIT (OUTPATIENT)
Dept: FAMILY MEDICINE CLINIC | Age: 43
End: 2023-10-20

## 2023-10-20 VITALS
BODY MASS INDEX: 34.16 KG/M2 | HEIGHT: 60 IN | SYSTOLIC BLOOD PRESSURE: 116 MMHG | RESPIRATION RATE: 18 BRPM | HEART RATE: 76 BPM | OXYGEN SATURATION: 97 % | DIASTOLIC BLOOD PRESSURE: 74 MMHG | WEIGHT: 174 LBS

## 2023-10-20 DIAGNOSIS — R73.01 IFG (IMPAIRED FASTING GLUCOSE): ICD-10-CM

## 2023-10-20 DIAGNOSIS — E78.00 HYPERCHOLESTEROLEMIA: ICD-10-CM

## 2023-10-20 DIAGNOSIS — F31.74 BIPOLAR DISORDER, IN FULL REMISSION, MOST RECENT EPISODE MANIC (HCC): Primary | ICD-10-CM

## 2023-10-20 LAB
ALBUMIN SERPL-MCNC: 4.5 G/DL (ref 3.4–5)
ALBUMIN/GLOB SERPL: 2 {RATIO} (ref 1.1–2.2)
ALP SERPL-CCNC: 76 U/L (ref 40–129)
ALT SERPL-CCNC: 9 U/L (ref 10–40)
ANION GAP SERPL CALCULATED.3IONS-SCNC: 9 MMOL/L (ref 3–16)
AST SERPL-CCNC: 15 U/L (ref 15–37)
BILIRUB SERPL-MCNC: <0.2 MG/DL (ref 0–1)
BUN SERPL-MCNC: 5 MG/DL (ref 7–20)
CALCIUM SERPL-MCNC: 8.8 MG/DL (ref 8.3–10.6)
CHLORIDE SERPL-SCNC: 104 MMOL/L (ref 99–110)
CHOLEST SERPL-MCNC: 262 MG/DL (ref 0–199)
CO2 SERPL-SCNC: 27 MMOL/L (ref 21–32)
CREAT SERPL-MCNC: 0.7 MG/DL (ref 0.6–1.1)
GFR SERPLBLD CREATININE-BSD FMLA CKD-EPI: >60 ML/MIN/{1.73_M2}
GLUCOSE SERPL-MCNC: 76 MG/DL (ref 70–99)
HDLC SERPL-MCNC: 34 MG/DL (ref 40–60)
LDLC SERPL CALC-MCNC: ABNORMAL MG/DL
LDLC SERPL-MCNC: 176 MG/DL
POTASSIUM SERPL-SCNC: 4.2 MMOL/L (ref 3.5–5.1)
PROT SERPL-MCNC: 6.7 G/DL (ref 6.4–8.2)
SODIUM SERPL-SCNC: 140 MMOL/L (ref 136–145)
TRIGL SERPL-MCNC: 379 MG/DL (ref 0–150)
VLDLC SERPL CALC-MCNC: ABNORMAL MG/DL

## 2023-10-20 PROCEDURE — 99213 OFFICE O/P EST LOW 20 MIN: CPT | Performed by: NURSE PRACTITIONER

## 2023-10-20 PROCEDURE — 36415 COLL VENOUS BLD VENIPUNCTURE: CPT | Performed by: NURSE PRACTITIONER

## 2023-10-20 RX ORDER — FLUOXETINE HYDROCHLORIDE 20 MG/1
60 CAPSULE ORAL DAILY
Qty: 90 CAPSULE | Refills: 5 | Status: SHIPPED | OUTPATIENT
Start: 2023-10-20

## 2023-10-20 SDOH — ECONOMIC STABILITY: INCOME INSECURITY: HOW HARD IS IT FOR YOU TO PAY FOR THE VERY BASICS LIKE FOOD, HOUSING, MEDICAL CARE, AND HEATING?: NOT HARD AT ALL

## 2023-10-20 SDOH — ECONOMIC STABILITY: FOOD INSECURITY: WITHIN THE PAST 12 MONTHS, THE FOOD YOU BOUGHT JUST DIDN'T LAST AND YOU DIDN'T HAVE MONEY TO GET MORE.: NEVER TRUE

## 2023-10-20 SDOH — ECONOMIC STABILITY: FOOD INSECURITY: WITHIN THE PAST 12 MONTHS, YOU WORRIED THAT YOUR FOOD WOULD RUN OUT BEFORE YOU GOT MONEY TO BUY MORE.: NEVER TRUE

## 2023-10-20 SDOH — ECONOMIC STABILITY: HOUSING INSECURITY
IN THE LAST 12 MONTHS, WAS THERE A TIME WHEN YOU DID NOT HAVE A STEADY PLACE TO SLEEP OR SLEPT IN A SHELTER (INCLUDING NOW)?: NO

## 2023-10-20 ASSESSMENT — PATIENT HEALTH QUESTIONNAIRE - PHQ9
5. POOR APPETITE OR OVEREATING: 0
SUM OF ALL RESPONSES TO PHQ QUESTIONS 1-9: 1
3. TROUBLE FALLING OR STAYING ASLEEP: 0
4. FEELING TIRED OR HAVING LITTLE ENERGY: 1
2. FEELING DOWN, DEPRESSED OR HOPELESS: 0
1. LITTLE INTEREST OR PLEASURE IN DOING THINGS: 0
9. THOUGHTS THAT YOU WOULD BE BETTER OFF DEAD, OR OF HURTING YOURSELF: 0
SUM OF ALL RESPONSES TO PHQ9 QUESTIONS 1 & 2: 0
8. MOVING OR SPEAKING SO SLOWLY THAT OTHER PEOPLE COULD HAVE NOTICED. OR THE OPPOSITE, BEING SO FIGETY OR RESTLESS THAT YOU HAVE BEEN MOVING AROUND A LOT MORE THAN USUAL: 0
SUM OF ALL RESPONSES TO PHQ QUESTIONS 1-9: 1
7. TROUBLE CONCENTRATING ON THINGS, SUCH AS READING THE NEWSPAPER OR WATCHING TELEVISION: 0
SUM OF ALL RESPONSES TO PHQ QUESTIONS 1-9: 1
6. FEELING BAD ABOUT YOURSELF - OR THAT YOU ARE A FAILURE OR HAVE LET YOURSELF OR YOUR FAMILY DOWN: 0
10. IF YOU CHECKED OFF ANY PROBLEMS, HOW DIFFICULT HAVE THESE PROBLEMS MADE IT FOR YOU TO DO YOUR WORK, TAKE CARE OF THINGS AT HOME, OR GET ALONG WITH OTHER PEOPLE: 0
SUM OF ALL RESPONSES TO PHQ QUESTIONS 1-9: 1

## 2023-10-20 ASSESSMENT — COLUMBIA-SUICIDE SEVERITY RATING SCALE - C-SSRS
4. HAVE YOU HAD THESE THOUGHTS AND HAD SOME INTENTION OF ACTING ON THEM?: NO
7. DID THIS OCCUR IN THE LAST THREE MONTHS: NO
3. HAVE YOU BEEN THINKING ABOUT HOW YOU MIGHT KILL YOURSELF?: NO
5. HAVE YOU STARTED TO WORK OUT OR WORKED OUT THE DETAILS OF HOW TO KILL YOURSELF? DO YOU INTEND TO CARRY OUT THIS PLAN?: NO

## 2023-10-20 ASSESSMENT — ENCOUNTER SYMPTOMS
WHEEZING: 0
COUGH: 0
SHORTNESS OF BREATH: 0

## 2023-10-20 NOTE — PROGRESS NOTES
Appearance: Normal appearance. She is obese. Pulmonary:      Effort: Pulmonary effort is normal.   Neurological:      Mental Status: She is alert and oriented to person, place, and time. Psychiatric:         Mood and Affect: Mood normal.         Behavior: Behavior normal.         Thought Content: Thought content normal.         Judgment: Judgment normal.               This dictation was generated by voice recognition computer software. Although all attempts are made to edit the dictation for accuracy, there may be errors in the transcription that are not intended. An electronic signature was used to authenticate this note.     --DANELLE Salter - CNP

## 2023-10-20 NOTE — PATIENT INSTRUCTIONS
at time of . Instead of the fee, you can choose to have the paperwork filled out during a separate office visit that is for filling out the paperwork only. Medication Samples: This office does not carry medication samples. If you need assistance in getting your medications, then please let the medical assistant know so they can help you sign up for a drug assistance program that can help get medications at a reduced cost or even free (if you qualify). Workman's Comp Claims: We do not handle workman's comp cases or claims. You will need to go to an urgent care to be seen or to whomever your employer uses. General - Any abusive/rude behavior toward staff/providers may be cause for dismissal.     The [facility] may receive a survey regarding the care you received during your visit. Your input is valuable to us. We encourage you to complete and return your survey. We hope you will choose us in the future for your healthcare needs.

## 2023-10-21 LAB
EST. AVERAGE GLUCOSE BLD GHB EST-MCNC: 102.5 MG/DL
HBA1C MFR BLD: 5.2 %

## 2024-05-23 DIAGNOSIS — F31.74 BIPOLAR DISORDER, IN FULL REMISSION, MOST RECENT EPISODE MANIC (HCC): ICD-10-CM

## 2024-05-23 RX ORDER — FLUOXETINE HYDROCHLORIDE 20 MG/1
60 CAPSULE ORAL DAILY
Qty: 90 CAPSULE | Refills: 0 | Status: SHIPPED | OUTPATIENT
Start: 2024-05-23

## 2024-06-24 NOTE — PATIENT INSTRUCTIONS
You may receive a survey regarding the care you received during your visit.  Your input is valuable to us.  We encourage you to complete and return your survey.  We hope you will choose us in the future for your healthcare needs. GENERAL OFFICE POLICIES      Telephone Calls: Messages will be answered within 1-2 business days, unless the provider is out of the office.  If it is urgent a covering provider will answer. (this does not include Medication refills).    MyChart:  We recommend all patients sign up for [facility].  Through this portal you can see your lab results, request refills, schedule appointments, pay your bill and send messages to the office.   [facility] messages will be answered within 1-2 business days unless the provider is out of the office.  For urgent matters, please call the office.  Appointments:  All appointments must be scheduled.  We ask all patients to schedule their next follow up appointment before they leave the office to make sure you will be able to be seen before you run out of medications.  24 hours notice is required to cancel or reschedule an appointment to avoid being marked as a no show.  You may be dismissed from the practice after 3 no shows.    LATE for Appointment: If you are 15 or more minutes late for your appointment, you may be asked to reschedule.  MA/LAB APPTS: Must be scheduled, cannot accept walk in lab visits.  We only draw labs for patients established in our office.  We only do injections for medications ordered by our office.  Acute Sick Visits:  Nothing other than acute complaint will be addressed at this visit.  TRADITIONAL MEDICARE  DOES NOT COVER PHYSICALS  MEDICARE WELLNESS VISITS: These are NOT physicals but the free annual visit offered by Medicare to discuss wellness issues. Medication refills, checkups, etc. will not be addressed during this visit.  Medication Refills: Refills are handled electronically so please contact your pharmacy for medication refills

## 2024-06-25 ASSESSMENT — PATIENT HEALTH QUESTIONNAIRE - PHQ9
7. TROUBLE CONCENTRATING ON THINGS, SUCH AS READING THE NEWSPAPER OR WATCHING TELEVISION: NOT AT ALL
SUM OF ALL RESPONSES TO PHQ QUESTIONS 1-9: 6
SUM OF ALL RESPONSES TO PHQ9 QUESTIONS 1 & 2: 2
7. TROUBLE CONCENTRATING ON THINGS, SUCH AS READING THE NEWSPAPER OR WATCHING TELEVISION: NOT AT ALL
10. IF YOU CHECKED OFF ANY PROBLEMS, HOW DIFFICULT HAVE THESE PROBLEMS MADE IT FOR YOU TO DO YOUR WORK, TAKE CARE OF THINGS AT HOME, OR GET ALONG WITH OTHER PEOPLE: NOT DIFFICULT AT ALL
5. POOR APPETITE OR OVEREATING: SEVERAL DAYS
10. IF YOU CHECKED OFF ANY PROBLEMS, HOW DIFFICULT HAVE THESE PROBLEMS MADE IT FOR YOU TO DO YOUR WORK, TAKE CARE OF THINGS AT HOME, OR GET ALONG WITH OTHER PEOPLE: NOT DIFFICULT AT ALL
SUM OF ALL RESPONSES TO PHQ QUESTIONS 1-9: 6
4. FEELING TIRED OR HAVING LITTLE ENERGY: SEVERAL DAYS
5. POOR APPETITE OR OVEREATING: SEVERAL DAYS
SUM OF ALL RESPONSES TO PHQ QUESTIONS 1-9: 6
SUM OF ALL RESPONSES TO PHQ QUESTIONS 1-9: 6
1. LITTLE INTEREST OR PLEASURE IN DOING THINGS: SEVERAL DAYS
8. MOVING OR SPEAKING SO SLOWLY THAT OTHER PEOPLE COULD HAVE NOTICED. OR THE OPPOSITE - BEING SO FIDGETY OR RESTLESS THAT YOU HAVE BEEN MOVING AROUND A LOT MORE THAN USUAL: SEVERAL DAYS
1. LITTLE INTEREST OR PLEASURE IN DOING THINGS: SEVERAL DAYS
8. MOVING OR SPEAKING SO SLOWLY THAT OTHER PEOPLE COULD HAVE NOTICED. OR THE OPPOSITE, BEING SO FIGETY OR RESTLESS THAT YOU HAVE BEEN MOVING AROUND A LOT MORE THAN USUAL: SEVERAL DAYS
6. FEELING BAD ABOUT YOURSELF - OR THAT YOU ARE A FAILURE OR HAVE LET YOURSELF OR YOUR FAMILY DOWN: NOT AT ALL
SUM OF ALL RESPONSES TO PHQ QUESTIONS 1-9: 6
9. THOUGHTS THAT YOU WOULD BE BETTER OFF DEAD, OR OF HURTING YOURSELF: NOT AT ALL
9. THOUGHTS THAT YOU WOULD BE BETTER OFF DEAD, OR OF HURTING YOURSELF: NOT AT ALL
4. FEELING TIRED OR HAVING LITTLE ENERGY: SEVERAL DAYS
6. FEELING BAD ABOUT YOURSELF - OR THAT YOU ARE A FAILURE OR HAVE LET YOURSELF OR YOUR FAMILY DOWN: NOT AT ALL
2. FEELING DOWN, DEPRESSED OR HOPELESS: SEVERAL DAYS
3. TROUBLE FALLING OR STAYING ASLEEP: SEVERAL DAYS
3. TROUBLE FALLING OR STAYING ASLEEP: SEVERAL DAYS
2. FEELING DOWN, DEPRESSED OR HOPELESS: SEVERAL DAYS

## 2024-06-26 ENCOUNTER — OFFICE VISIT (OUTPATIENT)
Dept: FAMILY MEDICINE CLINIC | Age: 44
End: 2024-06-26
Payer: COMMERCIAL

## 2024-06-26 VITALS
DIASTOLIC BLOOD PRESSURE: 72 MMHG | HEART RATE: 70 BPM | BODY MASS INDEX: 32.79 KG/M2 | OXYGEN SATURATION: 97 % | WEIGHT: 167 LBS | SYSTOLIC BLOOD PRESSURE: 122 MMHG | HEIGHT: 60 IN

## 2024-06-26 DIAGNOSIS — F31.74 BIPOLAR DISORDER, IN FULL REMISSION, MOST RECENT EPISODE MANIC (HCC): Primary | ICD-10-CM

## 2024-06-26 DIAGNOSIS — Z12.31 SCREENING MAMMOGRAM FOR BREAST CANCER: ICD-10-CM

## 2024-06-26 DIAGNOSIS — L70.0 ACNE VULGARIS: ICD-10-CM

## 2024-06-26 DIAGNOSIS — Z78.0 MENOPAUSE: ICD-10-CM

## 2024-06-26 LAB
ESTRADIOL SERPL-MCNC: 11 PG/ML
FSH SERPL-ACNC: 156.7 MIU/ML
HCG SERPL QL: NEGATIVE
PROLACTIN SERPL IA-MCNC: 16.1 NG/ML
TSH SERPL DL<=0.005 MIU/L-ACNC: 1.7 UIU/ML (ref 0.27–4.2)

## 2024-06-26 PROCEDURE — 99213 OFFICE O/P EST LOW 20 MIN: CPT | Performed by: NURSE PRACTITIONER

## 2024-06-26 RX ORDER — FLUOXETINE HYDROCHLORIDE 20 MG/1
60 CAPSULE ORAL DAILY
Qty: 90 CAPSULE | Refills: 2 | Status: SHIPPED | OUTPATIENT
Start: 2024-06-26

## 2024-06-26 RX ORDER — CLINDAMYCIN AND BENZOYL PEROXIDE 10; 50 MG/G; MG/G
GEL TOPICAL
Qty: 50 G | Refills: 2 | Status: SHIPPED | OUTPATIENT
Start: 2024-06-26

## 2024-06-26 ASSESSMENT — ENCOUNTER SYMPTOMS
COUGH: 0
WHEEZING: 0
SHORTNESS OF BREATH: 0

## 2024-06-26 NOTE — PROGRESS NOTES
Mary Carmen August (:  1980) is a 43 y.o. female,Established patient, here for evaluation of the following chief complaint(s):  Mental Health Problem (FOLLOW UP ON MENTAL HEALTH )      ASSESSMENT/PLAN:  1. Bipolar disorder, in full remission, most recent episode manic ([facility])  -Controlled on current dose of fluoxetine.  No changes today.  Refill.  Follow-up in 6 months for physical with fasting labs, or sooner if needed  -     FLUoxetine (PROZAC) 20 MG capsule; Take 3 capsules by mouth daily, Disp-90 capsule, R-2Normal  2. Menopause  -Description is consistent with menopause.  Will order labs to confirm.  Educated on possible intervention for menopause.  Patient decided not to have any intervention today.  Will follow-up as needed.  -     Follicle Stimulating Hormone; Future  -     Estradiol; Future  -     TSH with Reflex; Future  -     HCG Qualitative, Serum; Future  -     Prolactin; Future  3. Acne vulgaris  -Patient has been having worsening acne with perceived menopause.  Will treat with BenzaClin gel.  Educated on medication use as well as side effects.  Follow-up as needed.  -     clindamycin-benzoyl peroxide (BENZACLIN) 1-5 % gel; Apply topically 2 times daily., Disp-50 g, R-2, Normal  4. Screening mammogram for breast cancer  -     [facility] DIGITAL SCREEN BILATERAL; Future      Return in about 6 months (around 2024) for Annual Physical/Fasting Labs.    SUBJECTIVE/OBJECTIVE:  AISHA Lentz presents today for mood follow-up.  Her only concern today is that she may be going through menopause.  She notes that she has not had a period in over a year.  She has been getting hot flashes, night sweats, mood swings, and more body aches.  States that her mom also underwent menopause in her early 40s.  Inquiring if anything needs to be done to treat it.  Also inquiring if there are labs to further evaluate to ensure that she is indeed going through menopause.    The patient continues to take fluoxetine without

## 2024-12-17 DIAGNOSIS — F31.74 BIPOLAR DISORDER, IN FULL REMISSION, MOST RECENT EPISODE MANIC (HCC): ICD-10-CM

## 2025-02-06 DIAGNOSIS — F31.74 BIPOLAR DISORDER, IN FULL REMISSION, MOST RECENT EPISODE MANIC (HCC): ICD-10-CM

## 2025-04-02 DIAGNOSIS — F31.74 BIPOLAR DISORDER, IN FULL REMISSION, MOST RECENT EPISODE MANIC: ICD-10-CM

## 2025-04-02 NOTE — TELEPHONE ENCOUNTER
LV 6/26/24 WITH TR NV NONE  Return in about 6 months (around 12/26/2024) for Annual Physical/Fasting Labs.   PLEASE CALL PT TO SCHEDULE APPT ONCE SCHEDULED WE CAN SEND IN REFILL

## 2025-04-04 NOTE — TELEPHONE ENCOUNTER
Called and left detailed message for patient advising her to call the office back to schedule her physical and fasting labs then we can send her medication in. Sc

## 2025-04-23 DIAGNOSIS — F31.74 BIPOLAR DISORDER, IN FULL REMISSION, MOST RECENT EPISODE MANIC: ICD-10-CM

## 2025-04-23 NOTE — TELEPHONE ENCOUNTER
Patients mom was calling to get a refill on her daughters medication. Daughter was working and asked her mom to call.     She is unsure of the name and MG, She said she takes it 1x daily and it controls her craziness     [facility]   Phone number

## (undated) DEVICE — BANDAGE COMPR W3INXL5YD COT ZN OXIDE TAN E ADH HVYWT

## (undated) DEVICE — PACK PROCEDURE SURG C SECT REV 1

## (undated) DEVICE — 3M™ STERI-STRIP™ COMPOUND BENZOIN TINCTURE 40 BAGS/CARTON 4 CARTONS/CASE C1544: Brand: 3M™ STERI-STRIP™

## (undated) DEVICE — GARMENT,MEDLINE,DVT,INT,CALF,MED, GEN2: Brand: MEDLINE

## (undated) DEVICE — 3M™ STERI-STRIP™ REINFORCED ADHESIVE SKIN CLOSURES, R1549, 1/2 IN X 2 IN (12 MM X 50 MM), 6 STRIPS/ENVELOPE: Brand: 3M™ STERI-STRIP™

## (undated) DEVICE — PENCIL SMK EVAC L10FT TBNG NONSTICK ESU BLDE PLUMEPEN ELITE

## (undated) DEVICE — 50" SINGLE PATIENT USE HOVERMATT: Brand: SINGLE PATIENT USE HOVERMATT

## (undated) DEVICE — CHLORAPREP 26ML ORANGE

## (undated) DEVICE — 9165 UNIVERSAL PATIENT PLATE: Brand: 3M™

## (undated) DEVICE — BLADE CLIPPER GEN PURP NS

## (undated) DEVICE — STAPLER SKIN SQ 30 ABSRB STPL DISP INSORB

## (undated) DEVICE — Z CONVERTED USE 2275207 CLOTH PREP W7.5XL7.5IN 2% CHG SKIN ALC AND RNS FREE

## (undated) DEVICE — PAD DRESSING TELFA OUCHLESS NONADH 3X8IN

## (undated) DEVICE — WOUND RETRACTOR AND PROTECTOR: Brand: ALEXIS WOUND PROTECTOR-RETRACTOR

## (undated) DEVICE — TRAY URIN CATH 16FR DRNGE BG STATLOK STBL DEV F SURSTP

## (undated) DEVICE — SUTURE COAT VCRL SZ 0 L36IN ABSRB VLT CTX L48MM TAPERPOINT J370H

## (undated) DEVICE — GLOVE SURG SZ 65 THK91MIL LTX FREE SYN POLYISOPRENE

## (undated) DEVICE — SUTURE VCRL SZ 2-0 L36IN ABSRB UD L36MM CT-1 1/2 CIR J945H